# Patient Record
Sex: FEMALE | Race: WHITE
[De-identification: names, ages, dates, MRNs, and addresses within clinical notes are randomized per-mention and may not be internally consistent; named-entity substitution may affect disease eponyms.]

---

## 2017-07-11 ENCOUNTER — HOSPITAL ENCOUNTER (EMERGENCY)
Dept: HOSPITAL 25 - ED | Age: 52
Discharge: HOME | End: 2017-07-11
Payer: COMMERCIAL

## 2017-07-11 ENCOUNTER — HOSPITAL ENCOUNTER (EMERGENCY)
Dept: HOSPITAL 25 - UCEAST | Age: 52
Discharge: ANOTHER HOSPITAL | End: 2017-07-11
Payer: COMMERCIAL

## 2017-07-11 VITALS — SYSTOLIC BLOOD PRESSURE: 107 MMHG | DIASTOLIC BLOOD PRESSURE: 73 MMHG

## 2017-07-11 VITALS — DIASTOLIC BLOOD PRESSURE: 72 MMHG | SYSTOLIC BLOOD PRESSURE: 112 MMHG

## 2017-07-11 DIAGNOSIS — R11.0: ICD-10-CM

## 2017-07-11 DIAGNOSIS — R11.2: ICD-10-CM

## 2017-07-11 DIAGNOSIS — R42: Primary | ICD-10-CM

## 2017-07-11 DIAGNOSIS — R00.1: ICD-10-CM

## 2017-07-11 LAB
ALBUMIN SERPL BCG-MCNC: 4.4 G/DL (ref 3.2–5.2)
ALP SERPL-CCNC: 82 U/L (ref 34–104)
ALT SERPL W P-5'-P-CCNC: 17 U/L (ref 7–52)
ANION GAP SERPL CALC-SCNC: 7 MMOL/L (ref 2–11)
AST SERPL-CCNC: 23 U/L (ref 13–39)
BUN SERPL-MCNC: 11 MG/DL (ref 6–24)
BUN/CREAT SERPL: 12.1 (ref 8–20)
CALCIUM SERPL-MCNC: 9.3 MG/DL (ref 8.6–10.3)
CHLORIDE SERPL-SCNC: 102 MMOL/L (ref 101–111)
GLOBULIN SER CALC-MCNC: 3.4 G/DL (ref 2–4)
GLUCOSE SERPL-MCNC: 86 MG/DL (ref 70–100)
HCO3 SERPL-SCNC: 29 MMOL/L (ref 22–32)
HCT VFR BLD AUTO: 43 % (ref 35–47)
HGB BLD-MCNC: 14.2 G/DL (ref 12–16)
MAGNESIUM SERPL-MCNC: 2 MG/DL (ref 1.9–2.7)
MCH RBC QN AUTO: 30 PG (ref 27–31)
MCHC RBC AUTO-ENTMCNC: 33 G/DL (ref 31–36)
MCV RBC AUTO: 90 FL (ref 80–97)
POTASSIUM SERPL-SCNC: 4 MMOL/L (ref 3.5–5)
PROT SERPL-MCNC: 7.8 G/DL (ref 6.4–8.9)
RBC # BLD AUTO: 4.79 10^6/UL (ref 4–5.4)
SODIUM SERPL-SCNC: 138 MMOL/L (ref 133–145)
TROPONIN I SERPL-MCNC: 0.01 NG/ML (ref ?–0.04)
TSH SERPL-ACNC: 6.5 MCIU/ML (ref 0.34–5.6)
WBC # BLD AUTO: 10.7 10^3/UL (ref 3.5–10.8)

## 2017-07-11 PROCEDURE — 83735 ASSAY OF MAGNESIUM: CPT

## 2017-07-11 PROCEDURE — 36415 COLL VENOUS BLD VENIPUNCTURE: CPT

## 2017-07-11 PROCEDURE — G0463 HOSPITAL OUTPT CLINIC VISIT: HCPCS

## 2017-07-11 PROCEDURE — 99213 OFFICE O/P EST LOW 20 MIN: CPT

## 2017-07-11 PROCEDURE — 80053 COMPREHEN METABOLIC PANEL: CPT

## 2017-07-11 PROCEDURE — 84443 ASSAY THYROID STIM HORMONE: CPT

## 2017-07-11 PROCEDURE — 84484 ASSAY OF TROPONIN QUANT: CPT

## 2017-07-11 PROCEDURE — 70450 CT HEAD/BRAIN W/O DYE: CPT

## 2017-07-11 PROCEDURE — 85025 COMPLETE CBC W/AUTO DIFF WBC: CPT

## 2017-07-11 PROCEDURE — 81003 URINALYSIS AUTO W/O SCOPE: CPT

## 2017-07-11 PROCEDURE — 99283 EMERGENCY DEPT VISIT LOW MDM: CPT

## 2017-07-11 PROCEDURE — 93005 ELECTROCARDIOGRAM TRACING: CPT

## 2017-07-11 PROCEDURE — 87086 URINE CULTURE/COLONY COUNT: CPT

## 2017-07-11 PROCEDURE — 83605 ASSAY OF LACTIC ACID: CPT

## 2017-07-11 PROCEDURE — 81015 MICROSCOPIC EXAM OF URINE: CPT

## 2017-07-11 NOTE — ED
Nevin CUEVAS Seung-Jae, scribed for Dewayne Mantilla MD on 07/11/17 at 1425 .





Dizziness





- HPI Summary


HPI Summary: 


51 y/o F pt referred from [facility] presents to the ED with c/o dizziness. 

Dizziness began Saturday morning and has been intermittent since onset. She 

characterizes the dizziness as a room spinning experience. Pt states the 

dizziness is worsened with movement and changing head position and can happen 

in both a supine position and in a walking motion. Associated Sx include nausea

, vomiting, "turning of objects" in vision. Pt also denies fever, congestion, 

and coughing.





- History Of Current Complaint


Chief Complaint: EDDizziness


Stated Complaint: DIZINESS/SENT FROM [facility]


Time Seen by Provider: 07/11/17 14:09


Hx Obtained From: Patient


Onset/Duration: Still Present


Timing: Intermittent Episode Lasting


Severity Initially: Moderate


Severity Currently: Severe


Character: Room Spinning


Aggravating Factor(s): Change In Head Position, Other - general movement and 

walking


Alleviating Factor(s): Nothing


Associated Signs And Symptoms: Positive: Nausea, Vomiting, Visual Changes.  

Negative: Fever





- Allergies/Home Medications


Allergies/Adverse Reactions: 


 Allergies











Allergy/AdvReac Type Severity Reaction Status Date / Time


 


Erythromycin AdvReac Severe GI Upset Verified 07/11/17 11:52


 


envirmental Allergy Mild runny nose Uncoded 07/11/17 11:52





   and eyes  











Home Medications: 


 Home Medications





Ibuprofen TAB* [Motrin TAB* 800 MG] 600 - 800 tab PO Q6H PRN 07/11/17 [History 

Confirmed 07/11/17]











PMH/Surg Hx/FS Hx/Imm Hx


Previously Healthy: No


Endocrine/Hematology History: Reports: Hx Thyroid Disease - hashimotos


   Denies: Hx Anticoagulant Therapy, Hx Diabetes


Cardiovascular History: Reports: Hx Hypertension


   Denies: Hx Congestive Heart Failure, Hx Deep Vein Thrombosis, Hx Myocardial 

Infarction, Hx Pacemaker/ICD


Respiratory History: 


   Denies: Hx Asthma, Hx Chronic Obstructive Pulmonary Disease (COPD), Hx Lung 

Cancer, Hx Pneumonia, Hx Pulmonary Embolism


GI History: Reports: Hx Ulcer - gerd


   Denies: Hx Gall Bladder Disease, Hx Gastrointestinal Bleed, Hx Urosepsis


 History: Reports: Hx Kidney Stones - None causing a problem at this time., 

Other  Problems/Disorders - hx stones per pt


   Denies: Hx Renal Disease


Musculoskeletal History: Reports: Other Musculoskeletal History - Sciatica


Neurological History: Reports: Hx Migraine, Other Neuro Impairments/Disorders - 

vertigo hx occassional


   Denies: Hx Dementia, Hx Seizures, Hx Transient Ischemic Attacks (TIA)


Psychiatric History: 


   Denies: Hx Anxiety, Hx Depression, Hx Schizophrenia, Hx Bipolar Disorder





- Cancer History


Hx Chemotherapy: No


Hx Radiation Therapy: No





- Surgical History


Surgery Procedure, Year, and Place: tubal,right foot , left shoulder,septal 

plasty, ENDOMETRIAL ABLATION.  left ear


Infectious Disease History: No


Infectious Disease History: 


   Denies: Hx Clostridium Difficile, Hx Hepatitis, Hx Human Immunodeficiency 

Virus (HIV), Hx of Known/Suspected MRSA, Hx Shingles, Hx Tuberculosis, Traveled 

Outside the [place] in Last 30 Days





- Family History


Known Family History: Positive: Cardiac Disease - mother CHF, brother CHF, 

Hypertension


   Negative: Diabetes





- Social History


Alcohol Use: Rare


Hx Substance Use: No


Substance Use Type: Reports: None


Smoking Status (MU): Never Smoked Tobacco





Review of Systems


Negative: Fever


Positive: Other - Negative congestion


Negative: Cough


Positive: Vomiting, Nausea


Neurological: Other - Dizziness (room spinning)


All Other Systems Reviewed And Are Negative: Yes





Physical Exam


Triage Information Reviewed: Yes


Vital Signs On Initial Exam: 


 Initial Vitals











Temp Pulse Resp BP Pulse Ox


 


 97.4 F   50   16   111/83   97 


 


 07/11/17 14:08  07/11/17 14:08  07/11/17 14:08  07/11/17 14:08  07/11/17 14:08











Vital Signs Reviewed: Yes


Appearance: Positive: Well-Appearing, No Pain Distress


Skin: Positive: Warm, Skin Color Reflects Adequate Perfusion, Dry


Head/Face: Positive: Normal Head/Face Inspection


Eyes: Positive: Normal, Other: - No nystagmus


ENT: Positive: Normal ENT inspection


Neck: Positive: Supple, Nontender


Respiratory/Lung Sounds: Positive: Clear to Auscultation, Breath Sounds Present


Cardiovascular: Positive: RRR


Abdomen Description: Positive: Nontender, Soft


Bowel Sounds: Positive: Present


Musculoskeletal: Positive: Normal


Neurological: Positive: Normal


Psychiatric: Positive: Normal





Diagnostics





- Vital Signs


 Vital Signs











  Temp Pulse Resp BP Pulse Ox


 


 07/11/17 14:13  98.1 F  52  16  111/83  100


 


 07/11/17 14:08  97.4 F  50  16  111/83  97














- Laboratory


Lab Results: 


 Lab Results











  07/11/17 07/11/17 07/11/17 Range/Units





  13:10 13:10 13:10 


 


WBC  10.7    (3.5-10.8)  10^3/ul


 


RBC  4.79    (4.0-5.4)  10^6/ul


 


Hgb  14.2    (12.0-16.0)  g/dl


 


Hct  43    (35-47)  %


 


MCV  90    (80-97)  fL


 


MCH  30    (27-31)  pg


 


MCHC  33    (31-36)  g/dl


 


RDW  15    (10.5-15)  %


 


Plt Count  255    (150-450)  10^3/ul


 


MPV  10    (7.4-10.4)  um3


 


Neut % (Auto)  79.7    (38-83)  %


 


Lymph % (Auto)  15.0 L    (25-47)  %


 


Mono % (Auto)  4.0    (1-9)  %


 


Eos % (Auto)  1.0    (0-6)  %


 


Baso % (Auto)  0.3    (0-2)  %


 


Absolute Neuts (auto)  8.5 H    (1.5-7.7)  10^3/ul


 


Absolute Lymphs (auto)  1.6    (1.0-4.8)  10^3/ul


 


Absolute Monos (auto)  0.4    (0-0.8)  10^3/ul


 


Absolute Eos (auto)  0.1    (0-0.6)  10^3/ul


 


Absolute Basos (auto)  0    (0-0.2)  10^3/ul


 


Absolute Nucleated RBC  0.01    10^3/ul


 


Nucleated RBC %  0.1    


 


Sodium   138   (133-145)  mmol/L


 


Potassium   4.0   (3.5-5.0)  mmol/L


 


Chloride   102   (101-111)  mmol/L


 


Carbon Dioxide   29   (22-32)  mmol/L


 


Anion Gap   7   (2-11)  mmol/L


 


BUN   11   (6-24)  mg/dL


 


Creatinine   0.91   (0.51-0.95)  mg/dL


 


Est GFR ( Amer)   84.2   (>60)  


 


Est GFR (Non-Af Amer)   65.4   (>60)  


 


BUN/Creatinine Ratio   12.1   (8-20)  


 


Glucose   86   ()  mg/dL


 


Lactic Acid    1.3  (0.5-2.0)  mmol/L


 


Calcium   9.3   (8.6-10.3)  mg/dL


 


Magnesium   2.0   (1.9-2.7)  mg/dL


 


Total Bilirubin   0.40   (0.2-1.0)  mg/dL


 


AST   23   (13-39)  U/L


 


ALT   17   (7-52)  U/L


 


Alkaline Phosphatase   82   ()  U/L


 


Troponin I   0.01   (<0.04)  ng/mL


 


Total Protein   7.8   (6.4-8.9)  g/dL


 


Albumin   4.4   (3.2-5.2)  g/dL


 


Globulin   3.4   (2-4)  g/dL


 


Albumin/Globulin Ratio   1.3   (1-3)  


 


TSH   6.50 H   (0.34-5.60)  mcIU/mL


 


Urine Color     


 


Urine Appearance     


 


Urine pH     (5-9)  


 


Ur Specific Gravity     (1.010-1.030)  


 


Urine Protein     (Negative)  


 


Urine Ketones     (Negative)  


 


Urine Blood     (Negative)  


 


Urine Nitrate     (Negative)  


 


Urine Bilirubin     (Negative)  


 


Urine Urobilinogen     (Negative)  


 


Ur Leukocyte Esterase     (Negative)  


 


Urine Glucose     (Negative)  


 


Urine Ascorbic Acid     (Negative)  














  07/11/17 Range/Units





  16:47 


 


WBC   (3.5-10.8)  10^3/ul


 


RBC   (4.0-5.4)  10^6/ul


 


Hgb   (12.0-16.0)  g/dl


 


Hct   (35-47)  %


 


MCV   (80-97)  fL


 


MCH   (27-31)  pg


 


MCHC   (31-36)  g/dl


 


RDW   (10.5-15)  %


 


Plt Count   (150-450)  10^3/ul


 


MPV   (7.4-10.4)  um3


 


Neut % (Auto)   (38-83)  %


 


Lymph % (Auto)   (25-47)  %


 


Mono % (Auto)   (1-9)  %


 


Eos % (Auto)   (0-6)  %


 


Baso % (Auto)   (0-2)  %


 


Absolute Neuts (auto)   (1.5-7.7)  10^3/ul


 


Absolute Lymphs (auto)   (1.0-4.8)  10^3/ul


 


Absolute Monos (auto)   (0-0.8)  10^3/ul


 


Absolute Eos (auto)   (0-0.6)  10^3/ul


 


Absolute Basos (auto)   (0-0.2)  10^3/ul


 


Absolute Nucleated RBC   10^3/ul


 


Nucleated RBC %   


 


Sodium   (133-145)  mmol/L


 


Potassium   (3.5-5.0)  mmol/L


 


Chloride   (101-111)  mmol/L


 


Carbon Dioxide   (22-32)  mmol/L


 


Anion Gap   (2-11)  mmol/L


 


BUN   (6-24)  mg/dL


 


Creatinine   (0.51-0.95)  mg/dL


 


Est GFR ( Amer)   (>60)  


 


Est GFR (Non-Af Amer)   (>60)  


 


BUN/Creatinine Ratio   (8-20)  


 


Glucose   ()  mg/dL


 


Lactic Acid   (0.5-2.0)  mmol/L


 


Calcium   (8.6-10.3)  mg/dL


 


Magnesium   (1.9-2.7)  mg/dL


 


Total Bilirubin   (0.2-1.0)  mg/dL


 


AST   (13-39)  U/L


 


ALT   (7-52)  U/L


 


Alkaline Phosphatase   ()  U/L


 


Troponin I   (<0.04)  ng/mL


 


Total Protein   (6.4-8.9)  g/dL


 


Albumin   (3.2-5.2)  g/dL


 


Globulin   (2-4)  g/dL


 


Albumin/Globulin Ratio   (1-3)  


 


TSH   (0.34-5.60)  mcIU/mL


 


Urine Color  Yellow  


 


Urine Appearance  Cloudy  


 


Urine pH  7.0  (5-9)  


 


Ur Specific Gravity  1.009 L  (1.010-1.030)  


 


Urine Protein  Negative  (Negative)  


 


Urine Ketones  Negative  (Negative)  


 


Urine Blood  Negative  (Negative)  


 


Urine Nitrate  Negative  (Negative)  


 


Urine Bilirubin  Negative  (Negative)  


 


Urine Urobilinogen  Negative  (Negative)  


 


Ur Leukocyte Esterase  1+ H  (Negative)  


 


Urine Glucose  Negative  (Negative)  


 


Urine Ascorbic Acid  * H  (Negative)  











Result Diagrams: 


 07/11/17 13:10





 07/11/17 13:10


Lab Statement: Any lab studies that have been ordered have been reviewed, and 

results considered in the medical decision making process.





- CT


  ** Brain CT


CT Interpretation: No Acute Changes - Impression: Negative examination


CT Interpretation Completed By: Radiologist





- EKG


  ** 14:47


Cardiac Rate: Bradycardia - 45 bpm


EKG Rhythm: Sinus Bradycardia


ST Segment: Non-Specific - T wave flattening





Re-Evaluation





- Re-Evaluation


  ** 16:35


Re-Evaluation Time: 16:35


Change: Improved





  ** Second Eval


Re-Evaluation Time: 17:32


Change: Improved


Comment: Discussed plan to D/C





Dizzy Course/Dx





- Course


Course Of Treatment: Ms. Aquino has had mild vertiginous symptoms for a couple 

days that got worse today while vacuuming.  I could not elicit any nystagmus 

with either Rosston-Halpike maneuver.  Her W/U was WNL and she imporved quite a bit 

with meclizine.  I think this is a peripheral vertigo, likely labrynthitis and 

will treat her symtomatically and recommend F/U if not improving.





- Diagnoses


Provider Diagnoses: 


 Vertigo








- Provider Notifications


Discussed Care Of Patient With: Jose Loyd


Time Discussed With Above Provider: 16:40


Instructed by Provider To: Other - Dr. Loyd states that it sounds like mild 

peripheral vertigo.





Discharge





- Discharge Plan


Condition: Stable


Disposition: HOME


Prescriptions: 


Meclizine TAB* [Antivert 12.5 TAB*] 25 mg PO TID PRN #20 tab


 PRN Reason: Dizziness


Patient Education Materials:  Vertigo (ED)


Forms:  *Work Release


Referrals: 


Douglas Copeland MD [Primary Care Provider] - 3 Days





The documentation as recorded by the Nevin lujan Seung-Jae accurately reflects 

the service I personally performed and the decisions made by me, Dewayne Mantilla MD.

## 2017-07-11 NOTE — RAD
INDICATION: Vertigo     



COMPARISON: March 21, 2014



TECHNIQUE: Noncontrast axial source images were acquired from the skull base to the

vertex.



FINDINGS:



Ventricles/sulci: The ventricles and cisterns are normal in size and configuration for

age.



Brain parenchyma: There is no focal parenchymal finding, evidence of intracranial mass, 

or intracranial mass effect.



Intracranial hemorrhage:None.



Extra-axial spaces: There are no abnormal extra axial fluid collections or evidence of

extra-axial mass.



Calvarium: There is no calvarial fracture or other calvarial abnormality.



Scalp: There is no evidence of scalp or extracalvarial soft tissue abnormality.



Paranasal sinuses/mastoid: The paranasal sinuses and mastoid air cells are clear.



Other: None.



IMPRESSION: NEGATIVE EXAMINATION

## 2017-07-11 NOTE — UC
Dizzy HPI


HPI Summary: 





complaint of feeling dizzy since 7/8/17


intermittent spells of dizziness


starts every morning and then fades away


today while vaccuming and she started having dizziness , nausea and  

diaphoresis today


feels chest pressure since she woke up this morning


feels anxious


denies shortness of breath


dizziness increases with movement of her head


feels extremely tired today


denies V/D


denies fever and chills





- History Of Current Complaint


Chief Complaint: UCDizziness


Stated Complaint: DIZZINESS


Time Seen by Provider: 07/11/17 12:25


Hx Obtained From: Patient


Hx Last Menstrual Period: ten years ago





- Risk Factors


Cardiac Risk Factors: Hypertension, CHF, Family History, CAD





- Allergies/Home Medications


Allergies/Adverse Reactions: 


 Allergies











Allergy/AdvReac Type Severity Reaction Status Date / Time


 


Erythromycin AdvReac Severe GI Upset Verified 07/11/17 11:52


 


envirmental Allergy Mild runny nose Uncoded 07/11/17 11:52





   and eyes  














PMH/Surg Hx/FS Hx/Imm Hx


Previously Healthy: Yes - hashimoto's, vertigo 10 years ago


Endocrine History: Hypothyroidism


Cardiovascular History: Hypertension


GI/ History: Gastroesophageal Reflux


Other History Of: 


   Negative For: HIV, Hepatitis B, Hepatitis C, Anticoagulant Therapy





- Surgical History


Surgical History: Yes


Surgery Procedure, Year, and Place: tubal,right foot , left shoulder,septal 

plasty, ENDOMETRIAL ABLATION.  left ear





- Family History


Known Family History: Positive: Cardiac Disease - mother CHF, brother CHF, 

Hypertension


   Negative: Diabetes





- Social History


Occupation: Employed Full-time


Lives: With Family


Alcohol Use: Rare


Substance Use Type: None


Smoking Status (MU): Never Smoked Tobacco





- Immunization History


Most Recent Influenza Vaccination: none


Most Recent Tetanus Shot: unsure





Review of Systems


Constitutional: Negative


Skin: Negative


Eyes: Negative


ENT: Negative


Respiratory: Negative


Cardiovascular: Other - chest pressure


Gastrointestinal: Nausea


Genitourinary: Negative


Motor: Negative


Neurovascular: Negative


Musculoskeletal: Negative


Neurological: Negative


Psychological: Anxious


All Other Systems Reviewed And Are Negative: Yes





Physical Exam


Triage Information Reviewed: Yes


Appearance: No Pain Distress, Well-Nourished, Obese


Vital Signs: 


 Initial Vital Signs











Temp  98.4 F   07/11/17 11:44


 


Pulse  51   07/11/17 11:44


 


Resp  18   07/11/17 11:44


 


BP  107/73   07/11/17 11:44











Vital Signs Reviewed: Yes


Eyes: Positive: Conjunctiva Clear


ENT: Positive: Pharynx normal, TMs normal


Neck: Positive: No Lymphadenopathy


Respiratory: Positive: Lungs clear, Normal breath sounds, No respiratory 

distress, No accessory muscle use


Cardiovascular: Positive: No Murmur, Pulses Normal, Bradycardia


Abdomen Description: Positive: Nontender, No Organomegaly, Soft.  Negative: CVA 

Tenderness (R), CVA Tenderness (L), Distended, Guarding


Bowel Sounds: Positive: Present


Musculoskeletal: Positive: No Edema


Neurological: Positive: Alert


Psychological: Positive: Normal Response To Family, Age Appropriate Behavior


Skin Exam: Normal





Diagnostics





- EKG


Cardiac Rate: Bradycardia


Ectopy: None





Dizzy Course/Dx





- Course


Course Of Treatment: exam completed.  ECG shows sinus bradycardia- 49 BPM no 

ectopy.  pt complains of dizziness, chest pressure, nausea, anxiety since she 

woke up this morning.  due to acute nature of complaints will send to higher 

level of care for further evaluation





- Differential Dx/Diagnosis


Differential Diagnosis/HQI/PQRI: Anxiety, Myocardial Infarction, Other - vertigo

, sinus bradycardia d/t electrolyte disturbances


Provider Diagnoses: dizziness





- Physician Notifications


Discussed Patient Care With: Sima Puente


Time Discussed With Above Provider: 13:05





Discharge





- Discharge Plan


Condition: Stable


Disposition: TRANS HIGHER LVL OF CARE FAC

## 2018-12-23 ENCOUNTER — HOSPITAL ENCOUNTER (EMERGENCY)
Dept: HOSPITAL 25 - UCEAST | Age: 53
Discharge: HOME | End: 2018-12-23
Payer: COMMERCIAL

## 2018-12-23 VITALS — DIASTOLIC BLOOD PRESSURE: 70 MMHG | SYSTOLIC BLOOD PRESSURE: 104 MMHG

## 2018-12-23 DIAGNOSIS — I10: ICD-10-CM

## 2018-12-23 DIAGNOSIS — R51: Primary | ICD-10-CM

## 2018-12-23 DIAGNOSIS — R42: ICD-10-CM

## 2018-12-23 DIAGNOSIS — Z88.1: ICD-10-CM

## 2018-12-23 PROCEDURE — 93005 ELECTROCARDIOGRAM TRACING: CPT

## 2018-12-23 PROCEDURE — 99211 OFF/OP EST MAY X REQ PHY/QHP: CPT

## 2018-12-23 PROCEDURE — 96372 THER/PROPH/DIAG INJ SC/IM: CPT

## 2018-12-23 PROCEDURE — G0463 HOSPITAL OUTPT CLINIC VISIT: HCPCS

## 2018-12-23 NOTE — UC
Headache HPI





- HPI Summary


HPI Summary: 





The patient is a 53-year-old female with a greater then 10 year history of 

intermittent vertigo who presents here with the onset of vertigo about 2 weeks 

ago.  These were her typical allergic vertiginous episodes and they responded 

to meclizine.  She now no longer has true vertigo but has some mild dizziness.  

About the time of the onset of the vertigo she had a mild headache.  She has a 

history of migraines.  She states that the headache is left hemicranial and has 

persisted for the past 2 weeks.  She has some mild visual disturbances.  What 

is unusual is that she has some mild left sided neck pain associated with this.

  Currently her headache level is about a 4.  She is on propranolol for blood 

pressure.





- History Of Current Complaint


Chief Complaint: UCHeadache


Stated Complaint: HEADACHE


Time Seen by Provider: 12/23/18 07:39


Hx Obtained From: Patient


Hx Last Menstrual Period: ablasion/tubal ligation 2005


Onset/Duration: Gradual Onset, Lasting Weeks


Onset Of Symptoms: Gradual


Initially Headache Was: Mild


Currently Pain Is: Mild


Pain Intensity: 4


Pain Scale Used: 0-10 Numeric


Timing: Constant


Character: Dull


Location of Headache: Other: - left hemicranial


Allevating Factor(s): Nothing


Associated Signs And Symptoms: Positive: Dizziness, Neck Pain, Visual Changes.  

Negative: Nausea, Vomiting, Sinus Pressure, Fever, Neck Stiffness, Decreased LOC





- Allergies/Home Medications


Allergies/Adverse Reactions: 


 Allergies











Allergy/AdvReac Type Severity Reaction Status Date / Time


 


MS Erythromycin AdvReac Severe GI Upset Verified 07/11/17 11:52





[Erythromycin]     


 


envirmental Allergy Mild runny nose Uncoded 07/11/17 11:52





   and eyes  











Home Medications: 


 Home Medications





Omeprazole  12/23/18 [History]


traZODone TAB* [Desyrel TAB*]  12/23/18 [History]











PMH/Surg Hx/FS Hx/Imm Hx


Previously Healthy: Yes


Cardiovascular History: Hypertension


Neurological History: Migraine


Psychological History: Anxiety


Other History Of: 


   Negative For: HIV, Hepatitis B, Hepatitis C, Anticoagulant Therapy





- Surgical History


Surgical History: Yes


Surgery Procedure, Year, and Place: tubal,right foot , left shoulder,septal 

plasty, ENDOMETRIAL ABLATION.  left ear





- Family History


Known Family History: Positive: Cardiac Disease - mother CHF, brother CHF, 

Hypertension


   Negative: Diabetes





- Social History


Alcohol Use: None


Substance Use Type: None


Smoking Status (MU): Never Smoked Tobacco





- Immunization History


Most Recent Influenza Vaccination: none


Most Recent Tetanus Shot: unsure





Review of Systems


All Other Systems Reviewed And Are Negative: Yes


Constitutional: Positive: Negative


Skin: Positive: Negative


Eyes: Positive: Negative


ENT: Positive: Negative


Respiratory: Positive: Negative


Cardiovascular: Positive: Negative


Gastrointestinal: Positive: Negative


Genitourinary: Positive: Negative


Motor: Positive: Negative


Neurovascular: Positive: Negative


Musculoskeletal: Positive: Negative


Neurological: Positive: Headache


Psychological: Positive: Negative





Physical Exam


Triage Information Reviewed: Yes


Appearance: Well-Appearing, No Pain Distress, Well-Nourished


Vital Signs: 


 Initial Vital Signs











Temp  97.4 F   12/23/18 07:35


 


Pulse  54   12/23/18 07:35


 


Resp  16   12/23/18 07:35


 


BP  104/70   12/23/18 07:35


 


Pulse Ox  97   12/23/18 07:35











Eyes: Positive: Conjunctiva Clear, Other: - eomi/perrl


ENT: Positive: Pharynx normal, Uvula midline.  Negative: Nasal congestion, 

Nasal drainage, TMs normal - left TM scarred, TM bulging, Tonsillar swelling, 

Tonsillar exudate, Trismus, Muffled voice, Hoarse voice, Dental tenderness, 

Sinus tenderness


Neck: Positive: Supple, Nontender, No Lymphadenopathy


Respiratory: Positive: Lungs clear, Normal breath sounds, No respiratory 

distress, No accessory muscle use


Cardiovascular: Positive: RRR, Bradycardia


Musculoskeletal: Positive: ROM Intact, No Edema


Neurological: Positive: Alert


Psychological Exam: Normal


Skin Exam: Normal





Diagnostics





- EKG


Cardiac Rate: Bradycardia


Cardiac Rhythm: Sinus: Normal


Ectopy: None


ST Segment: Normal


EKG Comparison: No Significant Change





Re-Evaluation





- Re-Evaluation


  ** First Eval


Re-Evaluation Time: 09:10


Change: Improved





Headache Course/Dx





- Differential Dx/Diagnosis


Provider Diagnosis: 


 Cervicogenic headache, Dizziness, nonspecific








Discharge





- Sign-Out/Discharge


Documenting (check all that apply): Patient Departure


All imaging exams completed and their final reports reviewed: No Studies





- Discharge Plan


Condition: Stable


Disposition: HOME


Patient Education Materials:  Acute Headache (ED)


Referrals: 


Douglas Copeland MD [Primary Care Provider] - As Soon As Possible


Additional Instructions: 


If symptoms worsen I suggest you go to the ER





See your MD first available appt





you can resume motrin 6 hours after the shot





- Billing Disposition and Condition


Condition: STABLE


Disposition: Home

## 2019-01-07 ENCOUNTER — HOSPITAL ENCOUNTER (EMERGENCY)
Dept: HOSPITAL 25 - ED | Age: 54
Discharge: HOME | End: 2019-01-07
Payer: COMMERCIAL

## 2019-01-07 VITALS — DIASTOLIC BLOOD PRESSURE: 71 MMHG | SYSTOLIC BLOOD PRESSURE: 119 MMHG

## 2019-01-07 DIAGNOSIS — Z88.1: ICD-10-CM

## 2019-01-07 DIAGNOSIS — I10: ICD-10-CM

## 2019-01-07 DIAGNOSIS — R11.0: ICD-10-CM

## 2019-01-07 DIAGNOSIS — K21.9: ICD-10-CM

## 2019-01-07 DIAGNOSIS — R51: Primary | ICD-10-CM

## 2019-01-07 DIAGNOSIS — Z87.891: ICD-10-CM

## 2019-01-07 PROCEDURE — 96374 THER/PROPH/DIAG INJ IV PUSH: CPT

## 2019-01-07 PROCEDURE — 99283 EMERGENCY DEPT VISIT LOW MDM: CPT

## 2019-01-07 PROCEDURE — 70450 CT HEAD/BRAIN W/O DYE: CPT

## 2019-01-07 PROCEDURE — 96375 TX/PRO/DX INJ NEW DRUG ADDON: CPT

## 2019-01-07 NOTE — XMS REPORT
Continuity of Care Document (CCD)

 Created on:2018



Patient:Shannan Aquino

Sex:Female

:1965

External Reference #:[id number]





Demographics







 Address  [address] [place] [address]

 

 Home Phone  [phone]

 

 Mobile Phone  [phone]

 

 Work Phone  [phone]

 

 Email Address  [email or web address]

 

 Preferred Language  en

 

 Marital Status  Not  or 

 

 Pentecostal Affiliation  Unknown

 

 Race  White

 

 Ethnic Group  Not  or 









Author







 Name  Yasmani Lopez D.O.

 

 Address  [address] [place] [id number]









Support







 Name  Relationship  Address  Phone

 

 Tito Newman  Domestic/Life Partner  Unavailable  [phone]









Care Team Providers







 Name  Role  Phone

 

 HCP given  Primary Care Physician  Unavailable









Payers







 Type  Date  Identification Numbers  Payment Provider  Subscriber

 

   Effective:  Policy Number: [id number]  [employer]  Shannan Aquino



   2017    Plan  









 PayID: [id number]  [address] [address]









 [place] [address]









   Effective: 2014  Policy Number:  [employer] ([place] [facility]  Shannan Aquino



     [id number]    









 Expires: 2017  PayID: [id number]  [address] [address]









 [place] [address]







Advance Directives







 Description

 

 No Information Available







Problems







 Date  Description  Provider  Status

 

 Onset: 2012  Benign essential hypertension  Douglas Copeland M.D.  Active

 

 Onset: 2012  Migraine without aura, not refractory  Douglas Copeland M.D.  Active

 

 Onset: 2012  Gastroesophageal reflux disease  Douglas Copeland M.D.  
Active

 

 Onset: 2012  Myalgia & Myositis Unspecified  Douglas Copeland M.D.  
Active

 

 Onset: 2012  Rosacea  Douglas Copeland M.D.  Active

 

 Onset: 2012  Arthralgia of the lower leg  Douglas Copeland M.D.  Active

 

 Onset: 2014  Hashimoto thyroiditis  Douglas Copeland M.D.  Active

 

 Onset: 2014  Malaise and fatigue  Yasmani Lopez D.O.  Active

 

 Onset: 2014  Arthralgia of the pelvic region and  Yasmani Lopez D.O.  
Active



   thigh    

 

 Onset: 2017  Sciatica  Yasmani Lopez D.O.  Active

 

 Onset: 2017  Essential hypertension  Douglas Copeland M.D.  Active







Family History







 Date  Family Member(s)  Problem(s)  Comments

 

   Father  Alcoholism  

 

   Father   due to Cancer  () - Malignant



       Melanoma, age 55

 

   Father  Cancer, Melanoma  

 

   Mother  High Blood Pressure  

 

 :  (age 70  Mother   due to CHF  



 Years)      

 

   Children  2 sons  

 

   First Brother  High Blood Pressure  

 

   First Brother  General Health Good  

 

   Second Brother  General Health Good  

 

 :  (age 45  Third Brother   due to CHF  



 Years)      

 

   Third Brother  High Blood Pressure  







Social History







 Type  Date  Description  Comments

 

 Birth Sex    Unknown  

 

 Education    Highest Level  



     Completed College  

 

 Marital Status      

 

 Lives With    Boyfriend  

 

 Lives With    Son  

 

 Smoke-Free    Home is smoke-free  

 

 Work Status  2018  Currently Working  cleaning; at [place]



       [employer]

 

 Tobacco Use  Start: Unknown  Former Cigarette  quit in  ("only



   End: Unknown  Smoker  smoked for a couple



       of years")

 

 Tobacco Use  Start: Unknown  Non Smoker  

 

 Smoking Status  Reviewed: 18  Non Smoker  

 

 Exercise Type/Frequency  2018  Exercises  walks a lot for work,



       easily >10K steps/d.



       Also walks/runs on



       her own

 

 Sun Exposure    minimum amount of sun  



     exposure  

 

 Sun Exposure    Uses sunscreen  

 

 Seat Belt/Car Seat    always uses seat belt  

 

 Currently Active    Patient is currently  



     sexually active  

 

 Contraceptive Methods    None  

 

 Age [address] [place]    19 Years Old  

 

 # Partners in a Lifetime    8  







Allergies, Adverse Reactions, Alerts







 Description

 

 No Known Drug Allergies







Medications







 Medication  Date  Status  Form  Strength  Qnty  SIG  Indications  Ordering



                 Provider

 

 Propranolol    Active  Tablets  20mg  60tabs  take 1  I10  Silcoff,



 HCL  018          tablet by    Douglas,



             mouth twice    M.D.



             a day for    



             high blood    



             pressure and    



             Headache    



             Prevention    









 G43.009









 Trazodone HCL  2018  Active  Tablets  50mg  60tabs  Take One To  G47.00  
Silcoff,



             Two Tablets    Douglas,



             By Mouth AT    M.D.



             Bedtime For    



             Trouble    



             Sleeping    

 

 Selenium  2018  Active  Capsules  200mcg    1 q am  E06.3  Unknown

 

 Omeprazole  2017  Active  Capsules DR  40mg  30caps  take 1  K21.9  
Silcoff,



             capsule by    Douglas,



             mouth once    M.D.



             daily for    



             Acid Reflux    









 R07.0

 

 K21.0









 Meclizine HCL  2017  Active  Tablets  25mg    take 1 tablet    Unknown



             by mouth three    



             times a day if    



             needed for    



             dizziness    

 

 Multivitamin  2017  Active  Tablets      1 by mouth    Unknown



 Adult            every day    

 

 Ra Vitamin D-3  2016  Active  Capsules  5000Uni  90c  1 tab by mouth  
E55  Sopchak,



         t  aps  every day  or 7  .9  Yasmani,



             tabs once a    D.O.



             week    

 

 Wrist Splint  2015  Active  Misc    1un  fit to patient  719  Sopchak,



 Elastic/Small/Me          its  to assist in  .43  Yasmani,



 dium            wrist    D.O.



             extension. use    



             as directed for    



             wrist pain    

 

 Metronidazole  2012  Active  Cream  0.75%  45u  apply twice a  695  
Silcoff,



           nit  day to affected  .3  marshall Cole  areas on face    M.D.



             (for acne    



             rosacea)    

 

 Vitamin B    Active  Tablets      1 by mouth    Unknown



 Complex            twice daily    

 

                 

 

 Acetaminophen-Co  2017 -  Hx  Tablets  300-30m  20t  take one tid  B02  
Silcoff,



 deine #3  2018      g  abs  for pain, prn  .9  GABE Cole

 

 Metaxalone  2017 -  Hx  Tablets  800mg  30t  1 tid  B02  Silcoff,



   2018        abs    .9  GABE Cole

 

 Valtrex  2017 -  Hx  Tablets  1gm  21t  1 tab tid  B02  Silcoff,



   2017        abs    .9  GABE Cole

 

 Ibuprofen  2017 -  Hx  Tablets  600mg  60t  1 by mouth  B02  Silcoff,



   10/31/2017        abs  every 6 hours  .9  Douglas,



             as needed for    M.D.



             pain    

 

 Gabapentin  2017 -  Hx  Capsules  100mg  90c  1 tabs by mouth  R42  
Sopchak,



   2017        aps  three times a    Yasmani,



             day    D.O.

 

 Fluticasone  2017 -  Hx  Suspension  50mcg/A  48u  Inhale 2 sprays  H65  
Sopchak,



 Propionate  2018      ct  nit  into nostril  .22  Yasmani,



           s  daily in each    D.O.



             nostril for    



             nose    



             inflammation    

 

 Amoxicillin  2016 -  Hx  Tablets  500mg  30t  1 by mouth  H65  Silcoff,



   2016        abs  three times a  .02  kapil Cole for 10    M.D.



             days, for ear    



             infection    

 

 PT For Low Back,  2016 -  Hx        evaluate and  M54  Silcokatharina,



 Right Hip And  2017          treat, instruct  .5  Douglas



 Right Leg Pain            in GABE bolden



             modalities prn    



             (combination of    



             lbp, troch    



             bursitis,    



             possible hip    



             joint    



             pathology)    









 M25.551

 

 M70.61









 Ra Vitamin D-3  2015 -  Hx  Capsules  5000Unit  90caps  1 tab by    
Jessica,



   2015          mouth every    Yasmani, D.O.



             day  or 7    



             tabs once a    



             week    

 

 Vitamin D3  2015 -  Hx  Capsules  1000Unit  OTC  1 by mouth  268.  
Dinorah,



   2016          every day  9  GABE Cole

 

 Propranolol HCL  2015 -  Hx  Tablets  60mg  60tabs  take 12  I10  
Silcoff,



   2018          tablet by    el Cole twice a    M.D.



             day for blood    



             pressure and    



             Migraine    



             Headaches    









 G43.009









 Ciprodex  09/10/2014 -  Hx  Suspension  0.3-0.1%  10cc  4 drops in  388.9  
Luis E,



   2015          both ears    Sheridan MIRANDA



             twice a day    



             for 7 days    



             stop and    



             call if you    



             are no    



             better in 48    



             hours    

 

 Off Work  09/10/2014 -  Hx        until monday  388.9  Luis E,



   2015          the 15th due    Sheridan MIRANDA



             to acute    



             illness    

 

 Vitamin D-3  2014 -  Hx  Tablets  5000Unit  90tabs  1 tab by  268.9  
Sopchak,



   2015          mouth every    Yasmani,



             day  or 7    D.O.



             tabs once a    



             week    

 

 Ankle Lace-Up  2013 -  Hx  Misc    1units  for left  719.47  Dinorah



 Brace  2014          ankle    GABE Cole

 

 PT For Left  2013 -  Hx        please  719.47  Dinorah,



 Ankle And Foot  2014          evaluae and    Douglas,



 Pain And Ankle            treat,    M.D.



 Instability            instruct i    



             hep,    



             modalities    



             prn    

 

 Famotidine  2013 -  Hx  Tablets  40mg  90tabs  take 1  K21.9  Silcoff,



   2017          tablet by    Douglas,



             mouth Every    M.D.



             Night For    



             Acid Reflux    









 R07.0

 

 K21.0









 PT For  2012 -  Hx        please evaluate  719.46  Dinorah,



 Bilateral Knee  2014          and treatDouglas M.D.



 Pain            instruct in    



             hep, modalities    



             prn    

 

 Famotidine  2012 -  Hx  Tablets  20mg  30tab  Take 1 Tablet  530.81  
Silcoff,



   2013        s  Every Evening    GABE Cole



             For Acid    



             Reflux.    









 784.1

 

 536.8









 Metronidazole  2012 -  Hx  Gel  0.75%  45gm  apply to  695.3  Silcoff,



   2012          affected    Douglas



             areas on    M.D.



             face twice    



             a day (for    



             acne    



             rosacea)    

 

 Nortriptyline HCL  2012 -  Hx  Capsules  10mg  100caps  1 pill  729.1  
Silcoff,



   2012          nightly (30    Douglas,



             minutes    M.D.



             before bed)    



             to start;    



             inc by 1    



             tablet    



             weekly if    



             needed; max    



             5    



             pills/night    









 780.52









 Flonase  2012 -  Hx  Suspension  50mcg/Act    2 sprays  477.9  Unknown



   2012          each nostril    



             once daily    



             as needed    



             for    



             allergies    

 

 Propranolol HCL  2012 -  Hx  Tablets  80mg  60ta  take 1  401.1  Silcoff,



   2015        bs  tablet twice    Douglas,



             a day for    M.D.



             blood    



             pressure and    



             migraines    









 346.10









 Protonix  2011 -  Hx  Tablets  40mg  30tabs  1 by mouth every  530.81  
Silcoff,



   2014          day for acid    Douglas,



             reflux/swallowing    M.D.



             difficulties    

 

 Aldomet  2004 -  Hx  Tablets  250mg  30tabs  1 po qhs to start.  401.1  
Silcoff,



   2012          We may need to    Douglas,



             increase it to    M.D.



             twice a day if your    



             blood pressure    



             remains elevated    

 

 Norvasc  2004 -  Hx  Tablets  5mg  90tabs  1 PO qd For High  401.1  
Silcoff,



   2004          Blood Pressure    GABE Cole

 

 Metoprolol  2004 -  Hx  Tablets  50mg  90tabs  1/2 PO bid  401.1  Silcoff
,



   2004              GABE Cole

 

 Vitamin B   -  Hx  Tablets      1 by mouth once    Unknown



 Complex  2016          daily    







Immunizations







 CPT Code  Status  Date  Vaccine  Lot #

 

 [id number]  Given  10/05/2018  Influenza Virus Vaccine, Quadrivalent, Split,  [id number]



       Preservative Free  

 

 73063  Given  2012  Adacel or Boostrix, TDaP  [id number]

 

 03721  Given  2003  Flu, Split Virus, 2-35 Mo Dose  









 









 15447  Refused  2012  Flu, Split Virus 3Yrs  

 

 70753  Refused  2012  Flu, Split Virus 3Yrs  







Vital Signs







 Date  Vital  Result  Comment

 

 2018 11:46am  BP Systolic  118 mmHg  









 BP Diastolic  80 mmHg  

 

 Weight  147.00 lb  









 10/16/2018  8:51am  BP Systolic  112 mmHg  









 BP Diastolic  78 mmHg  









 10/05/2018 10:30am  BP Systolic  110 mmHg  









 BP Diastolic  70 mmHg  

 

 Weight  149.00 lb  









 2018  4:07pm  BP Systolic  104 mmHg  









 BP Diastolic  64 mmHg  

 

 Heart Rate  52 /min  reg

 

 Respiratory Rate  12 /min  not laboured

 

 Height  62.50 inches  5'2.50"

 

 Weight  152.00 lb  

 

 BMI (Body Mass Index)  27.4 kg/m2  









 2018 11:11am  BP Systolic  116 mmHg  









 BP Diastolic  68 mmHg  

 

 Height  63 inches  5'3"

 

 Weight  156.00 lb  154 at home

 

 BMI (Body Mass Index)  27.6 kg/m2  









 2018 11:05am  BP Systolic  104 mmHg  









 BP Diastolic  70 mmHg  









 2018 10:46am  BP Systolic  118 mmHg  









 BP Diastolic  80 mmHg  

 

 Height  63 inches  5'3"

 

 Weight  158.00 lb  

 

 BMI (Body Mass Index)  28.0 kg/m2  









 2017  3:33pm  BP Systolic  132 mmHg  









 BP Diastolic  80 mmHg  

 

 Heart Rate  70 /min  









 2017  3:40pm  BP Systolic  110 mmHg  









 BP Diastolic  70 mmHg  

 

 Body Temperature  98.0 F  









 2017  3:47pm  BP Systolic  120 mmHg  









 BP Diastolic  80 mmHg  

 

 Body Temperature  98.3 F  

 

 Weight  174.00 lb  w/shoes









 2017  2:42pm  BP Systolic  104 mmHg  









 BP Diastolic  68 mmHg  

 

 Weight  176.00 lb  









 2017  3:34pm  BP Systolic  102 mmHg  









 BP Diastolic  68 mmHg  

 

 BP Systolic Recheck  118 mmHg  R arm sitting

 

 BP Diastolic Recheck  80 mmHg  R arm sitting

 

 Heart Rate  56 /min  reg

 

 Weight  174.00 lb  w/shoes









 2017  4:01pm  BP Systolic  110 mmHg  









 BP Diastolic  70 mmHg  

 

 Height  62.75 inches  5'2.75"

 

 Weight  173.00 lb  

 

 BMI (Body Mass Index)  30.9 kg/m2  









 2017 12:57pm  BP Systolic  118 mmHg  









 BP Diastolic  76 mmHg  

 

 Weight  173.00 lb  









 2016  4:38pm  BP Systolic  120 mmHg  









 BP Diastolic  80 mmHg  

 

 Height  62.75 inches  5'2.75"

 

 Weight  173.00 lb  

 

 BMI (Body Mass Index)  30.9 kg/m2  









 06/15/2016  1:46pm  BP Systolic  116 mmHg  









 BP Diastolic  80 mmHg  









 2016  9:09am  BP Systolic  110 mmHg  









 BP Diastolic  80 mmHg  

 

 Weight  170.00 lb  









 2016 11:40am  BP Systolic  110 mmHg  









 BP Diastolic  78 mmHg  

 

 Body Temperature  98.8 F  

 

 Weight  173.00 lb  









 2016 10:59am  BP Systolic  128 mmHg  









 BP Diastolic  88 mmHg  

 

 Body Temperature  98.8 F  

 

 Height  63 inches  5'3"

 

 Weight  172.00 lb  

 

 BMI (Body Mass Index)  30.5 kg/m2  









 2015  3:33pm  BP Systolic  111 mmHg  









 BP Diastolic  73 mmHg  

 

 Heart Rate  56 /min  

 

 Weight  174.00 lb  w/shoes









 2015  2:32pm  BP Systolic  116 mmHg  









 BP Diastolic  70 mmHg  

 

 Heart Rate  56 /min  reg

 

 Respiratory Rate  12 /min  not laboured

 

 Height  63 inches  5'3"

 

 Weight  170.00 lb  

 

 BMI (Body Mass Index)  30.1 kg/m2  









 09/10/2014 11:03am  BP Systolic  110 mmHg  









 BP Diastolic  68 mmHg  

 

 Body Temperature  98.7 F  

 

 Weight  166.00 lb  









 2014  9:24am  BP Systolic  140 mmHg  









 BP Diastolic  88 mmHg  

 

 Height  63.5 inches  5'3.50" shoes on

 

 Weight  166.00 lb  shoes on

 

 BMI (Body Mass Index)  28.9 kg/m2  









 2014  3:13pm  BP Systolic  130 mmHg  









 BP Diastolic  90 mmHg  

 

 Weight  166.00 lb  shoes on









 2014  4:40pm  BP Systolic  118 mmHg  









 BP Diastolic  70 mmHg  









 2014  9:18am  BP Systolic  120 mmHg  









 BP Diastolic  80 mmHg  

 

 Body Temperature  98.3 F  

 

 Weight  168.00 lb  









 2014  2:17pm  BP Systolic  102 mmHg  









 BP Diastolic  74 mmHg  

 

 Heart Rate  60 /min  reg

 

 Respiratory Rate  12 /min  not laboured

 

 Height  62.50 inches  5'2.50"

 

 Weight  164.00 lb  

 

 BMI (Body Mass Index)  29.5 kg/m2  









 2013  4:18pm  BP Systolic  110 mmHg  









 BP Diastolic  80 mmHg  

 

 Weight  171.00 lb  









 2013  1:21pm  BP Systolic  135 mmHg  ????









 BP Diastolic  122 mmHg  ????

 

 BP Systolic Recheck  128 mmHg  

 

 BP Diastolic Recheck  72 mmHg  

 

 Heart Rate  59 /min  60

 

 Body Temperature  98.4 F  

 

 Weight  171.00 lb  









 2012  4:15pm  BP Systolic  118 mmHg  









 BP Diastolic  72 mmHg  

 

 Weight  170.00 lb  









 2012  3:52pm  BP Systolic  114 mmHg  









 BP Diastolic  70 mmHg  

 

 Body Temperature  98.2 F  

 

 Weight  170.00 lb  









 08/10/2012  4:46pm  BP Systolic  112 mmHg  









 BP Diastolic  76 mmHg  

 

 Heart Rate  56 /min  reg

 

 Respiratory Rate  12 /min  not laboured

 

 Height  63 inches  5'3"

 

 Weight  167.00 lb  

 

 BMI (Body Mass Index)  29.6 kg/m2  









 2012  4:05pm  BP Systolic  108 mmHg  









 BP Diastolic  70 mmHg  

 

 Height  63 inches  5'3"

 

 Weight  168.00 lb  

 

 BMI (Body Mass Index)  29.8 kg/m2  









 2012  2:57pm  BP Systolic  110 mmHg  









 BP Diastolic  80 mmHg  

 

 Weight  167.00 lb  

 

 Last Menstrual Period  0  









 2012  3:44pm  BP Systolic  116 mmHg  









 BP Diastolic  78 mmHg  

 

 Weight  169.00 lb  









 2012  4:01pm  BP Systolic  124 mmHg  









 BP Diastolic  80 mmHg  

 

 Heart Rate  72 /min  reg

 

 Respiratory Rate  12 /min  not laboured

 

 Height  62.75 inches  5'2.75"

 

 Weight  167.00 lb  

 

 BMI (Body Mass Index)  29.8 kg/m2  

 

 Last Menstrual Period  0  2004  3:21pm  BP Systolic  138 mmHg  









 BP Diastolic  82 mmHg  

 

 BP Systolic Recheck  138 mmHg  R arm sitting

 

 BP Diastolic Recheck  78 mmHg  R arm sitting

 

 Weight  150.00 lb  

 

 Last Menstrual Period  0  









 2004  1:13pm  BP Systolic  152 mmHg  









 BP Diastolic  84 mmHg  

 

 BP Systolic Recheck  142 mmHg  R arm sitting

 

 BP Diastolic Recheck  94 mmHg  R arm sitting

 

 Weight  145.00 lb  

 

 Last Menstrual Period  6079628  









 2004  2:43pm  BP Systolic  162 mmHg  R arm sitting









 BP Diastolic  100 mmHg  R arm sitting

 

 Respiratory Rate  12 /min  not laboured

 

 Body Temperature  99.7 F  

 

 Weight  140.00 lb  







Results







 Test  Date  Facility  Test  Result  H/L  Range  Note

 

 Laboratory test  10/16/2018  [facility]  Surgical  SEE RESULT      1



 finding    [phone]  Pathology  BELOW      

 

 Laboratory test  10/05/2018  [facility]  Cytology  SEE RESULT      2



 finding    [phone]    BELOW      

 

 Laboratory test  2018  [facility]  TSH (Thyroid  5.50  N  0.34-5.60  



 finding    [phone]  Stim Horm)  mcIU/mL      









 T3 Free  3.00 pg/mL  N  2.5-3.9  

 

 Free T4 (Free Thyroxine)  0.82 ng/dL  N  0.61-1.12  

 

 Thyroxine  8.68 g/mL  N  6.09-12.23  









 Laboratory test  2018  [facility]  TSH (Thyroid  5.62 mcIU/mL  High  
0.34-5.60  



 finding    [phone]  Stim Horm)        

 

 CBC Auto Diff  2018  [facility]  White Blood  6.7 10^3/uL  N  3.5-
10.8  



     [phone]  Count        









 Red Blood Count  4.75 10^6/uL  N  4.0-5.4  

 

 Hemoglobin  13.5 g/dL  N  12.0-16.0  

 

 Hematocrit  41 %  N  35-47  

 

 Mean Corpuscular Volume  85 fL  N  80-97  

 

 Mean Corpuscular Hemoglobin  28 pg  N  27-31  

 

 Mean Corpuscular HGB Conc  33 g/dL  N  31-36  

 

 Red Cell Distribution Width  14 %  N  10.5-15  

 

 Platelet Count  196 10^3/uL  N  150-450  

 

 Mean Platelet Volume  10 um3  N  7.4-10.4  

 

 Abs Neutrophils  4.8 10^3/uL  N  1.5-7.7  

 

 Abs Lymphocytes  1.3 10^3/uL  N  1.0-4.8  

 

 Abs Monocytes  0.4 10^3/uL  N  0-0.8  

 

 Abs Eosinophils  0.2 10^3/uL  N  0-0.6  

 

 Abs Basophils  0 10^3/uL  N  0-0.2  

 

 Abs Nucleated RBC  0 10^3/uL      

 

 Granulocyte %  71.6 %  N  38-83  

 

 Lymphocyte %  19.4 %  Low  25-47  

 

 Monocyte %  5.9 %  N  0-7  

 

 Eosinophil %  2.7 %  N  0-6  

 

 Basophil %  0.4 %  N  0-2  

 

 Nucleated Red Blood Cells %  0.3      









 Urine Micro Inhouse  2017  In House  Ua WBC  7      3









 Ua RBC  -      

 

 Ua Casts  -      

 

 Ua Epi  TNTC      

 

 Ua Other  calcium crystals      

 

 Ua Glucose  -      

 

 Ua Bilirubin  3+      

 

 Ua Ketones  -      

 

 Ua Specific Gravity  1.030      

 

 Ua Blood  -      

 

 Ua PH  5.0      

 

 Ua Protein  -      

 

 Ua Urobilinogen  -      

 

 Ua Nitrite  -      

 

 Ua Leukocytes  tr      









 CBC Auto Diff  2017  [facility]  White Blood Count  10.7 10^3/uL  N  
3.5-10.8  



     [phone]          









 Red Blood Count  4.79 10^6/uL  N  4.0-5.4  

 

 Hemoglobin  14.2 g/dL  N  12.0-16.0  

 

 Hematocrit  43 %  N  35-47  

 

 Mean Corpuscular Volume  90 fL  N  80-97  

 

 Mean Corpuscular Hemoglobin  30 pg  N  27-31  

 

 Mean Corpuscular HGB Conc  33 g/dL  N  31-36  

 

 Red Cell Distribution Width  15 %  N  10.5-15  

 

 Platelet Count  255 10^3/uL  N  150-450  

 

 Mean Platelet Volume  10 um3  N  7.4-10.4  

 

 Abs Neutrophils  8.5 10^3/uL  High  1.5-7.7  

 

 Abs Lymphocytes  1.6 10^3/uL  N  1.0-4.8  

 

 Abs Monocytes  0.4 10^3/uL  N  0-0.8  

 

 Abs Eosinophils  0.1 10^3/uL  N  0-0.6  

 

 Abs Basophils  0 10^3/uL  N  0-0.2  

 

 Abs Nucleated RBC  0.01 10^3/uL  N    

 

 Granulocyte %  79.7 %  N  38-83  

 

 Lymphocyte %  15.0 %  Low  25-47  

 

 Monocyte %  4.0 %  N  1-9  

 

 Eosinophil %  1.0 %  N  0-6  

 

 Basophil %  0.3 %  N  0-2  

 

 Nucleated Red Blood Cells %  0.1  N    









 Comp Metabolic Panel  2017  [facility]  Sodium  138 mmol/L  N  133-
145  



     [phone]          









 Potassium  4.0 mmol/L  N  3.5-5.0  

 

 Chloride  102 mmol/L  N  101-111  

 

 Co2 Carbon Dioxide  29 mmol/L  N  22-32  

 

 Anion Gap  7 mmol/L  N  2-11  

 

 Glucose  86 mg/dL  N    

 

 Blood Urea Nitrogen  11 mg/dL  N  6-24  

 

 Creatinine  0.91 mg/dL  N  0.51-0.95  

 

 BUN/Creatinine Ratio  12.1  N  8-20  

 

 Calcium  9.3 mg/dL  N  8.6-10.3  

 

 Total Protein  7.8 g/dL  N  6.4-8.9  

 

 Albumin  4.4 g/dL  N  3.2-5.2  

 

 Globulin  3.4 g/dL  N  2-4  

 

 Albumin/Globulin Ratio  1.3  N  1-3  

 

 Total Bilirubin  0.40 mg/dL  N  0.2-1.0  

 

 Alkaline Phosphatase  82 U/L  N    

 

 Alt  17 U/L  N  7-52  

 

 Ast  23 U/L  N  13-39  

 

 Egfr Non-  65.4  N  >60  

 

 Egfr   84.2  N  >60  4









 Laboratory test finding  2017  [facility]  Magnesium  2.0 mg/dL  N  
1.9-2.7  



     [phone]          









 Troponin-I (TnI)  0.01 ng/mL  N  <0.04  

 

 TSH (Thyroid Stim Horm)  6.50 mcIU/mL  High  0.34-5.60  

 

 Lactic Acid  1.3 mmol/L  N  0.5-2.0  5









 Urinalysis Profile  2017  [facility]  Urine Color  Yellow  N    



     [phone]          









 Urine Appearance  Cloudy  N    

 

 Urine Specific Gravity  1.009  Low  1.010-1.030  

 

 Urine pH  7.0  N  5-9  

 

 Urine Urobilinogen  Negative  N  Negative  

 

 Urine Ketones  Negative  N  Negative  

 

 Urine Protein  Negative  N  Negative  

 

 Urine Leukocytes  1+  Abnormal  Negative  

 

 Urine Blood  Negative  N  Negative  

 

 *  *  Abnormal  Negative  6

 

 Urine Nitrite  Negative  N  Negative  

 

 Urine Bilirubin  Negative  N  Negative  

 

 Urine Glucose  Negative  N  Negative  









 Laboratory test  2017  [facility]  Urine Culture And  SEE RESULT    
  7



 finding    [phone]  Sensitivities  BELOW      

 

 Laboratory test  2017  [facility]  Point of Care  119 mg/dL  High  74
-10  8



 finding    [phone]  Glucose      6  

 

 Laboratory test  2016  [facility]  TSH (Thyroid Stim  4.36 mcIU/mL  
N  0.34-  



 finding    [phone]  Horm)      5.60  









 T3 Free  2.60 pg/mL  N  2.5-3.9  

 

 Free T4 (Free Thyroxine)  0.80 ng/dL  N  0.61-1.12  









 CBC Auto Diff  2016  [facility]  White Blood Count  8.3 10^3/uL  N  
3.5-10.8  



     [phone]          









 Red Blood Count  5.16 10^6/uL  N  4.0-5.4  

 

 Hemoglobin  14.9 g/dL  N  12.0-16.0  

 

 Hematocrit  45 %  N  35-47  

 

 Mean Corpuscular Volume  87 fL  N  80-97  

 

 Mean Corpuscular Hemoglobin  29 pg  N  27-31  

 

 Mean Corpuscular HGB Conc  33 g/dL  N  31-36  

 

 Red Cell Distribution Width  14 %  N  10.5-15  

 

 Platelet Count  269 10^3/uL  N  150-450  

 

 Mean Platelet Volume  10 um3  N  7.4-10.4  

 

 Abs Neutrophils  6.4 10^3/uL  N  1.5-7.7  

 

 Abs Lymphocytes  1.3 10^3/uL  N  1.0-4.8  

 

 Abs Monocytes  0.5 10^3/uL  N  0-0.8  

 

 Abs Eosinophils  0.1 10^3/uL  N  0-0.6  

 

 Abs Basophils  0 10^3/uL  N  0-0.2  

 

 Abs Nucleated RBC  0 10^3/uL  N    

 

 Granulocyte %  77.3 %  N  38-83  

 

 Lymphocyte %  15.7 %  Low  25-47  

 

 Monocyte %  5.5 %  N  1-9  

 

 Eosinophil %  1.1 %  N  0-6  

 

 Basophil %  0.4 %  N  0-2  

 

 Nucleated Red Blood Cells %  0  N    









 Comp Metabolic Panel  2016  [facility]  Sodium  137 mmol/L  N  133-
145  



     [phone]          









 Potassium  4.4 mmol/L  N  3.5-5.0  

 

 Chloride  102 mmol/L  N  101-111  

 

 Co2 Carbon Dioxide  30 mmol/L  N  22-32  

 

 Anion Gap  5 mmol/L  N  2-11  

 

 Glucose  77 mg/dL  N    

 

 Blood Urea Nitrogen  11 mg/dL  N  6-24  

 

 Creatinine  0.90 mg/dL  N  0.51-0.95  

 

 BUN/Creatinine Ratio  12.2  N  8-20  

 

 Calcium  9.4 mg/dL  N  8.6-10.3  

 

 Total Protein  7.3 g/dL  N  6.4-8.9  

 

 Albumin  4.2 g/dL  N  3.2-5.2  

 

 Globulin  3.1 g/dL  N  2-4  

 

 Albumin/Globulin Ratio  1.4  N  1-3  

 

 Total Bilirubin  0.50 mg/dL  N  0.2-1.0  

 

 Alkaline Phosphatase  82 U/L  N    

 

 Alt  12 U/L  N  7-52  

 

 Ast  14 U/L  N  13-39  

 

 Egfr Non-  66.5  N  >60  

 

 Egfr   85.6  N  >60  9









 Celiac Panel  2016  [facility]  Tissue Transglutaminase IgA  <1.2 U/
mL  N    10



     [phone]  Ab        









 Immunoglobulin A  287 mg/dL  N  61 - 356  

 

 Celiac Interpretation  See Comment  N    11









 Celiac Hla DQ1/DQ2  2016  [facility]  Hla-Dqa1  SEE BELOW  N    12



     [phone]          









 Hla-DQB1  SEE BELOW  N    13

 

 Celiac Gene Pairs Present?  Yes  N    

 

 Celiac Gene Interpretation  See Comment  N    14









 Laboratory test  2016  [facility]  C Reactive  12.67 mg/L  High  < 
5.00  15



 finding    [phone]  Protein        









 Erythrocyte Sed Rate  24 mm/Hr  High  0-14  

 

 Vitamin D Total 25(Oh)  46.1 ng/mL  N  30-50  

 

 Vitamin B12  254 pg/mL  N  180-914  16









 Connective Tissue Panel  2016  [facility]  Anti-Nuclear Antibody  
0.3 U  N    17



     [phone]          









 Cyclic Citrullinated Peptide  18.9 U  N    18

 

 Interpretation  See Comment  N    19









 Laboratory  09/15/2016  [facility]  TSH (Thyroid  5.51  N  0.34-5.60  



 test finding    [phone]  Stim Horm)  mcIU/mL      

 

 Laboratory  06/15/2016  [facility]  TSH (Thyroid  6.59  High  0.34-5.60  



 test finding    [phone]  Stim Horm)  ?IU/mL      

 

 Xray  2015  [facility]  X-Ray, Wrist,  normal      



       Complete, Min.  wrist      



       Of 3 Views R        

 

 Laboratory  2015  [facility]  TSH (Thyroid  3.70 IU/mL  N  0.34-5.60
  



 test finding    [phone]  Stimulating        



       Horm)        









 Vitamin D Total 25(Oh)  22.8 ng/mL  Low  30-50  









 Urine Micro Inhouse  2015  In House  Ua WBC  0-2      









 Ua RBC  0-1      

 

 Ua Casts  -      

 

 Ua Epi  0-2      

 

 Ua Other  sediment      

 

 Ua Glucose  -      

 

 Ua Bilirubin  -      

 

 Ua Ketones  -      

 

 Ua Specific Gravity  1.005      

 

 Ua Blood  -      

 

 Ua PH  7.5      

 

 Ua Protein  -      

 

 Ua Urobilinogen  -      

 

 Ua Nitrite  -      

 

 Ua Leukocytes  2+      









 Urine Culture And  2015  [facility]  Urine Culture  (SEE NOTE)      
20



 Sensitivities    [phone]          

 

 Lyme Western Blot  2014  [facility]  Lyme Disease  Negative    
Negative  



     [phone]  IgG Ab WB        









 Lyme Disease IgG Bands Present  No bands detecte <SEE NOTE> kDa      21

 

 Lyme Disease IgM Ab WB  Negative    Negative  

 

 Lyme Disease IgM Bands Present  No bands detecte <SEE NOTE> kDa      22

 

 Lyme Disease Interpretation  See Comment      23









 Laboratory test  2014  [facility]  Erythrocyte Sed  21 mm/Hr  High  0
-14  



 finding    [phone]  Rate        









 Creatine Kinase  38 U/L      

 

 Angiotension Converting Enzyme  26 U/L    8 - 53  24

 

 Hepatitis C Antibody  Nonreactive    Nonreactive  

 

 Collins (Anti-Nuclear AB) Screen  Negative    Negative  

 

 Cyclic Citrullinated Pept IgG  <15.6 U      25

 

 Rheumatoid Factor  <15 IU/mL    <15  26

 

 Uric Acid  5.3 mg/dL    2.3-6.6  









 Vitamin D, 25  2014  [facility]  25-Hydroxy Vitamin D2  <4.0 ng/mL  
    



 Hydroxy    [phone]          









 25-Hydroxy Vitamin D3  26 ng/mL      

 

 25-Hydroxy Vitamin D Total  26 ng/mL      27









 Ehrlichia Igg &  2014  [facility]  Anaplasma  <1:64 titer    <1:64  
28



 Igm Abs    [phone]  phagocytophila IgG        









 Ehrlichia chaffeensis IgG AB  <1:64 titer    <1:64  29









 Laboratory test  2014  [facility]  Babesiosis  <1:64 titer    <1:64  
30



 finding    [phone]  Evaluation        

 

 Xray  2014  [facility]  CT, Brain (Or  l      



     [address]  Head) Without        



     [place] [address]  Contrast        



     [phone]          









 CT, Sinus without Contrast  past sinus surgy      









 Laboratory test  2014  [facility]  TSH (Thyroid  3.53 miu/mL    0.34-
5.60  



 finding    [phone]  Stimulating Horm)        

 

 CBC Auto Diff  2014  [facility]  White Blood Count  9.4 10^3/uL    
4.8-10.8  



     [phone]          









 Red Blood Count  4.74 10^6/uL    4.0-5.4  

 

 Hemoglobin  13.8 g/dL    12.0-16.0  

 

 Hematocrit  41 %    35-47  

 

 Mean Corpuscular Volume  86 fL    80-97  

 

 Mean Corpuscular Hemoglobin  29 pg    27-31  

 

 Mean Corpuscular HGB Conc  34 g/dL    31-36  

 

 Red Cell Distribution Width  14 %    10.5-15  

 

 Platelet Count  279 10^3/uL    150-450  

 

 Mean Platelet Volume  10 um3    7.4-10.4  

 

 Abs Neutrophils  6.4 10^3/uL    1.5-7.7  

 

 Abs Lymphocytes  2.2 10^3/uL    1.0-4.8  

 

 Abs Monocytes  0.6 10^3/uL    0-0.8  

 

 Abs Eosinophils  0.2 10^3/uL    0-0.6  

 

 Abs Basophils  0 10^3/uL    0-0.2  

 

 Abs Nucleated RBC  0.01 10^3/uL      

 

 Granulocyte %  68.4 %    38-83  

 

 Lymphocyte %  23.1 %  Low  25-47  

 

 Monocyte %  6.3 %    1-9  

 

 Eosinophil %  1.8 %    0-6  

 

 Basophil %  0.4 %    0-2  

 

 Nucleated Red Blood Cells %  0.1      









 Comp Metabolic Panel  2014  [facility]  Sodium  137 mmol/L    133-
145  



     [phone]          









 Potassium  4.7 mmol/L    3.5-5.0  

 

 Chloride  101 mmol/L    101-111  

 

 Co2 Carbon Dioxide  33.0 mmol/L  High  22-32  

 

 Anion Gap  3.0 mmol/L    2-11  

 

 Glucose  88 mg/dL      

 

 Blood Urea Nitrogen  8 mg/dL    6-24  

 

 Creatinine  0.70 mg/dL    0.50-1.40  

 

 BUN/Creatinine Ratio  11.4    8-20  

 

 Calcium  9.1 mg/dL    8.1-9.9  

 

 Total Protein  6.6 g/dL    6.2-8.1  

 

 Albumin  3.9 g/dL    3.6-5.4  

 

 Globulin  2.7 g/dL    2-4  

 

 Albumin/Globulin Ratio  1.4    1-3  

 

 Total Bilirubin  0.5 mg/dL    0.4-1.5  

 

 Alkaline Phosphatase  70 U/L      

 

 Alt  21 U/L    14-54  

 

 Ast  23 U/L    12-42  

 

 Egfr Non-  90.1    >60  

 

 Egfr   115.9    >60  31









 Basic Metabolic Panel  2013  [facility]  Sodium  137 mmol/L    133-
145  



     [phone]          









 Potassium  4.3 mmol/L    3.5-5.0  

 

 Chloride  101 mmol/L    101-111  

 

 Co2 Carbon Dioxide  29.0 mmol/L    22-32  

 

 Anion Gap  7.0 mmol/L    2-11  

 

 Glucose  106 mg/dL  High    

 

 Blood Urea Nitrogen  11 mg/dL    6-24  

 

 Creatinine  0.80 mg/dL    0.50-1.40  

 

 BUN/Creatinine Ratio  13.8    8-20  

 

 Calcium  8.9 mg/dL    8.1-9.9  

 

 Egfr Non-  77.2    >60  

 

 Egfr   99.3    >60  32









 Ua Inhouse  2013  In House  Ua Glucose  -      









 Ua Bilirubin  -      

 

 Ua Ketones  -      

 

 Ua Specific Gravity  1.005      

 

 Ua Blood  -      

 

 Ua PH  6.5      

 

 Ua Protein  -      

 

 Ua Urobilinogen  -      

 

 Ua Nitrite  -      

 

 Ua Leukocytes  -      









 Surgical  2013  [facility]  S  RUN DATE:      33



 Pathology    [phone]    01/15/ <SEE      



         NOTE>      

 

 Clotest  2013  [facility]  Clotest  NEGATIVE      34



     [phone]          

 

 Syphilis  2012  [facility]  Syphilis IgG  Nonreactive    Nonreactive
  35



 Screen    [phone]          









 RPR  TNP    Nonreactive  

 

 RPR Titer  TNP      

 

 Pediatric/Maternal  NO      









 Collins (Antinuclear  2008  [facility]  Antinuclear AB  NEGATIVE    
Negative  36



 Antibodies)    [phone]          

 

 Laboratory test  2008  [facility]  Rheumatoid  < 20.0 IU/mL    Less 
Than  



 finding    [phone]  Factor      20  









 Thyroxine  7.4 g/dL    5-12  

 

 TSH  3.43 MIU/ML    0.34-5.60  

 

 Erythrocyte Sed Rate  12 MM/HR    0-15  

 

 Complement C1Q  25 mg/dL  Abnormal  -  37









 1  SEE RESULT BELOW



   -----------------------------------------------------------------------------
---------------



   Name:  DESTINYSHANNAN                  : 1965    Attend Dr: Karyn COELHO



   Acct:  [id number]  Unit: [id number]  AGE: 53            Location:  [facility]



   Reg:   10/16/18                        SEX: F             Status:    REG REF



   -----------------------------------------------------------------------------
---------------



   



   SPEC: [id number]            MIKAYLA: 10/16/      [facility] DR: Karyn COELHO



   REQ:  [id number]             RECD: 10/16/



   STATUS: SOUT



   _



   ORDERED:  LEVEL 4



   COMMENTS: [id number]



   



   FINAL DIAGNOSIS



   



   



   Uterus, endocervix, biopsy:



   -- Benign endocervical polyp.



   



   



   -----------------------------------------------------------------------------
---------------



   



   



   PRE-OPERATIVE DIAGNOSIS



   



   Benign neoplasm of vagina



   



   GROSS DESCRIPTION



   



   The specimen is received in formalin labeled, Cervical Polyp, and consists 
of a 1.1 x 1.0 by



   up to 0.6 cm tan-red rubbery polypoid soft tissue fragment admixed with 
scant mucus.  The



   specimen is inked, serially sectioned and entirely submitted in one cassette.



   



   Signed by and Reported on: __________              Melanie Rodriguez MD 
10/17/18 1108



   



   -----------------------------------------------------------------------------
---------------



   



   



   



   



   



   



   



   



   



   



   



   



   



   ** END OF REPORT **



   



   DEPARTMENT OF PATHOLOGY,  [address], [place], [place] [address]



   Phone # [phone]      Fax #[phone]



   Cruz Vasquez M.D. Director     [facility] # [id number]

 

 2  SEE RESULT BELOW



   -----------------------------------------------------------------------------
---------------



   Name:  SHANNAN AQUINO                  : 1965    Attend Dr: Karyn COELHO



   Acct:  [id number]  Unit: [id number]  AGE: 53            Location:  [facility]



   Reg:   10/05/18                        SEX: F             Status:    REG REF



   -----------------------------------------------------------------------------
---------------



   



   SPEC: [id number]            MIKAYLA: 10/05/      [facility] DR: Karyn COELHO



   REQ:  [id number]             RECD: 10/05/



   STATUS: SOUT



   _



   ORDERED:  TP IMAGE ANALYS, HPV/Thin Prep



   COMMENTS: [id number]



   Negative for Intraepithelial lesion or Malignancy



   



   -----------------------------------------------------------------------------
---------------



   Date     Time Test              Result   Flag (u) Normal Range



   10/05/18 1410 @ HPV RNA         Negative          Negative



   @



   @               The high-risk HPV types detected by the assay include: 16,



   @               18, 31, 33, 35, 39, 45, 51, 52, 56, 58, 59, 66, and 68.



   -----------------------------------------------------------------------------
---------------



   



   A. Ectocervical/Endocervical



   Specimen Adequacy:



   Satisfactory of evaluation



   Transformation zone component cannot be definitely identified due to



   presence of atrophy or other hormonal changes



   Patient Information:



   HPV: High risk HPV RNA testing regardless of pap results.



   Actual Specimen Date:         10/05/18



   LMP If Unknown:               12+ yrs ago



   Pregnant?:     N



   Post Menopausal?:             Y



   Hysterectomy?:                N



   



   Signed by and Reported on: __________              REMI Randall(ASCP) 10
/08/18 2216



   



   This Pap test was evaluated with the assistance of the AcceraPrep Test Imaging 
System. Due to



   cytologic findings at the imager microscope, comprehensive manual 
rescreening by a



   Cytotechnologist may be required.



   The Pap Smear is a screening test designed to aid in the detection of 
premalignant and



   malignant conditions of the uterine cervix.  It is not a diagnostic 
procedure and should



   not be used as the sole means of detecting cervical cancer.  Both false-
positive and false-



   negative reports do occur.  Depending on your risk status, a Pap smear 
should be obtained



   and evaluated every 1-3 years.



   



   -----------------------------------------------------------------------------
---------------



   



   



   ** END OF REPORT **



   



   DEPARTMENT OF PATHOLOGY,  [address] [place] [address]



   Phone # [phone]      Fax #[phone]



   Cruz Vasquez M.D. Director     [facility] # [id number]

 

 3  void, clear, yellow

 

 4  *******Because ethnic data is not always readily available,



   this report includes an eGFR for both -Americans and



   non- Americans.****



   The National Kidney Disease Education Program (NKDEP) does



   not endorse the use of the MDRD equation for patients that



   are not between the ages of 18 and 70, are pregnant, have



   extremes of body size, muscle mass, or nutritional status,



   or are non- or non-.



   According to the National Kidney Foundation, irrespective of



   diagnosis, the stage of the disease is based on the level of



   kidney function:



   Stage Description                      GFR(mL/min/1.73 m(2))



   1     Kidney damage with normal or decreased GFR       90



   2     Kidney damage with mild decrease in GFR          60-89



   3     Moderate decrease in GFR                         30-59



   4     Severe decrease in GFR                           15-29



   5     Kidney failure                       <15 (or dialysis)

 

 5  [place] Severe Sepsis and Septic Shock Management Bundle Measure



   requires all lactic acids initially measuring >2.0 mmol/L be



   repeated.

 

 6  *Ascorbic acid is present which may interfere with detection



   of blood.

 

 7  SEE RESULT BELOW



   -----------------------------------------------------------------------------
---------------



   Name:  SHANNAN AQUINO                  : 1965    Attend Dr: Dewayne Mantilla MD



   Acct:  [id number]  Unit: [id number]  AGE: 52            Location:  [facility]



   Re17                        SEX: F             Status:    DEP ER



   -----------------------------------------------------------------------------
---------------



   



   SPEC: [id number]         MIKAYLA:   17-    [facility] DR: Dewayne Mantilla MD



   REQ:  [id number]              RECD:   



   STATUS: MORENO VOGT DR: [facility]



   Douglas Copeland MD



   _



   SOURCE: URINE          SPDESC:



   ORDERED:  Urine Culture



   



   -----------------------------------------------------------------------------
---------------



   Procedure                         Result                         Reported   
        Site



   -----------------------------------------------------------------------------
---------------



   Urine Culture  Final                                             17-
1040      ML



   



   No growth of clinically significant organisms



   



   -----------------------------------------------------------------------------
---------------



   * ML - MAIN LAB (PSC1)



   .



   



   



   



   



   



   



   



   



   



   



   



   



   



   



   



   



   



   



   



   



   



   



   



   



   



   ** END OF REPORT **



   



   * ML=Testing performed at Main Lab



   DEPARTMENT OF PATHOLOGY,  [address] [place] [address]



   Phone # [phone]      Fax #[phone]



   Cruz Vasquez M.D. Director     [facility] # [id number]

 

 8  : [id number]

 

 9  *******Because ethnic data is not always readily available,



   this report includes an eGFR for both -Americans and



   non- Americans.****



   The National Kidney Disease Education Program (NKDEP) does



   not endorse the use of the MDRD equation for patients that



   are not between the ages of 18 and 70, are pregnant, have



   extremes of body size, muscle mass, or nutritional status,



   or are non- or non-.



   According to the National Kidney Foundation, irrespective of



   diagnosis, the stage of the disease is based on the level of



   kidney function:



   Stage Description                      GFR(mL/min/1.73 m(2))



   1     Kidney damage with normal or decreased GFR       90



   2     Kidney damage with mild decrease in GFR          60-89



   3     Moderate decrease in GFR                         30-59



   4     Severe decrease in GFR                           15-29



   5     Kidney failure                       <15 (or dialysis)

 

 10  -------------------REFERENCE VALUE--------------------------



   <4.0 (Negative)



   Test Performed by:



   [place] [address]



   : Tito Salmeron II, M.D., Ph.D.

 

 11  Negative serology. Celiac disease unlikely. However,



   approximately 10% of patients with celiac disease are



   seronegative. Also, patients who are already adhering to a



   gluten-free diet may be seronegative. If celiac disease is



   highly clinically suspected, consider HLA-DQ typing.



   Test Performed by:



   [place] [address]



   : Tito Salmeron II, M.D., Ph.D.

 

 12  RESULT: 



   -------------------REFERENCE VALUE--------------------------



   Not Applicable

 

 13  RESULT: 03:02,05:03



   DQ Serologic Equivalent:



   8,5



   -------------------REFERENCE VALUE--------------------------



   Not Applicable

 

 14  These genes are permissive for celiac disease.  The absence



   of HLA celiac permissive genes would make the presence of



   celiac disease unlikely.  However, these genes can also be



   present in the normal population.



   -------------------ADDITIONAL INFORMATION-------------------



   Method: Molecular typing of HLA antigens performed using



   reverse SSOP and/or SSP methods, reported as serological



   equivalents and low to medium resolution molecular values.



   Performing Laboratory CLIA# [id number]



   Test Performed by:



   [facility] - [address] [address]



   : Tito Salmeron II, M.D., Ph.D.

 

 15  Acute inflammation:  >10.00

 

 16  Normal Range 180 to 914



   Indeterminate Range 145 to 180



   Deficient Range  <145

 

 17  -------------------REFERENCE VALUE--------------------------



   <=1.0 (Negative)

 

 18  -------------------REFERENCE VALUE--------------------------



   <20.0 (Negative)

 

 19  Tests for antibodies to dsDNA and KING antigens are not



   performed automatically unless the COLLINS result is > or=



   3.0 U.  Studies performed at [facility] indicate that



   positive COLLINS results <3.0 U are rarely accompanied by



   positive second order tests.



   Test Performed by:



   [facility] - [place] [id number]



   : Tito Salmeron II, M.D., Ph.D.

 

 20  RUN DATE: 01/31/15               [facility] **LIVE**       
         PAGE    1



   RUN TIME: 0638             [address] [place] [address]   [id number]



   Specimen Inquiry



   



   -----------------------------------------------------------------------------
---------------



   Name:  SHANNAN AQUINO                  : 1965    Attend Dr: 
Ana Maria EPPS



   Acct:  [id number]  Unit: [id number]  AGE: 50            Location:  Critical access hospital:   01/29/15                        SEX: F             Status:    DEP ER



   -----------------------------------------------------------------------------
---------------



   



   SPEC: [id number]         MIKAYLA:   01/29/    [facility] DR: Ana Maria Wright DO



   REQ:  [id number]              RECD:   01/29/



   STATUS: MORENO VOGT DR: Douglas Copeland MD



   _



   SOURCE: URINE          SPDESC:



   ORDERED:  Urine Culture



   



   -----------------------------------------------------------------------------
---------------



   Procedure                         Result                         Verified   
        Site



   -----------------------------------------------------------------------------
---------------



   Urine Culture  Final                                             01/31/15-
1234      ML



   



   Organism 1                     NORMAL SHANTEL



   [place] Count                1-10,000 (Few) CFU/ML



   



   -----------------------------------------------------------------------------
---------------



   



   



   



   



   



   



   



   



   



   



   



   



   



   



   



   



   



   



   



   



   



   



   



   



   



   



   



   ** END OF REPORT **



   



   * ML=Testing performed at Main Lab



   DEPARTMENT OF PATHOLOGY,  [address] [place] [address]



   Phone # [phone]      Fax #[phone]



   Cruz Vasquez M.D. Director     [facility] # [id number]

 

 21  No bands detected

 

 22  No bands detected

 

 23  Specific serologic response to B. burgdorferi infection is



   not detected, but cannot rule out early infection during



   which low or undetectable antibody levels to B. burgdorferi



   may be present.  If clinically indicated, a new serum



   specimen should be submitted in 7-14 days.



   CDC criteria require >=5 bands for IgG or >=2 bands for IgM



   for the Immunoblot to be considered positive. Bands



   (e.g.,p41) may be detected in patients without Lyme



   disease, and patterns not meeting the CDC criteria should



   be interpreted with caution.



   Immunoblot should be ordered only on specimens that are



   positive or equivocal by a FDA-licensed Lyme disease



   antibody screening test (e.g., EIA).



   Test Performed by:



   [place] [address]



   : Levi Chatman III, M.D.

 

 24  Test Performed by:



   [place] [address]



   : Levi Chatman III, M.D.

 

 25  -- REFERENCE VALUE --



   <20.0 (Negative)



   Test Performed by:



   [place] [address]



   : Levi Chatman III, M.D.

 

 26  Test Performed by:



   [place] [address]



   : Levi Chatman III, M.D.

 

 27  -- REFERENCE VALUE --



   25-HYDROXY D TOTAL (D2+D3) Optimum levels in the healthy



   population are 20-50, patients with bone disease may



   benefit from higher levels within this range.



   Test Performed by:



   [place] [address]



   : Levi Chatman III, M.D.

 

 28  Analyte Specific Reagent: This test was developed and its



   performance characteristics determined by [facility]. It



   has not been cleared or approved by the U.S. Food and Drug



   Administration.

 

 29  Analyte Specific Reagent: This test was developed and its



   performance characteristics determined by [facility]. It



   has not been cleared or approved by the U.S. Food and Drug



   Administration.



   Test Performed by:



   [facility] [place] [address]



   : Levi Chatman III, M.D.

 

 30  Analyte Specific Reagent: This test was developed and its



   performance characteristics determined by [facility]. It



   has not been cleared or approved by the U.S. Food and Drug



   Administration.



   Test Performed by:



   [facility] [place] [address]



   : Levi Chatman III, M.D.

 

 31  *******Because ethnic data is not always readily available,



   this report includes an eGFR for both -Americans and



   non- Americans.****



   The National Kidney Disease Education Program (NKDEP) does



   not endorse the use of the MDRD equation for patients that



   are not between the ages of 18 and 70, are pregnant, have



   extremes of body size, muscle mass, or nutritional status,



   or are non- or non-.



   According to the National Kidney Foundation, irrespective of



   diagnosis, the stage of the disease is based on the level of



   kidney function:



   Stage Description                      GFR(mL/min/1.73 m(2))



   1     Kidney damage with normal or decreased GFR       90



   2     Kidney damage with mild decrease in GFR          60-89



   3     Moderate decrease in GFR                         30-59



   4     Severe decrease in GFR                           15-29



   5     Kidney failure                       <15 (or dialysis)

 

 32  *******Because ethnic data is not always readily available,



   this report includes an eGFR for both -Americans and



   non- Americans.****



   The National Kidney Disease Education Program (NKDEP) does



   not endorse the use of the MDRD equation for patients that



   are not between the ages of 18 and 70, are pregnant, have



   extremes of body size, muscle mass, or nutritional status,



   or are non- or non-.



   According to the National Kidney Foundation, irrespective of



   diagnosis, the stage of the disease is based on the level of



   kidney function:



   Stage Description                      GFR(mL/min/1.73 m(2))



   1     Kidney damage with normal or decreased GFR       90



   2     Kidney damage with mild decrease in GFR          60-89



   3     Moderate decrease in GFR                         30-59



   4     Severe decrease in GFR                           15-29



   5     Kidney failure                       <15 (or dialysis)

 

 33  RUN DATE: 01/15/13               [facility] **LIVE**       
         PAGE    1



   RUN TIME: 1638             [address] [place] [address],  [place], [place]   [address]



   Specimen Inquiry



   



   -----------------------------------------------------------------------------
---------------



   Name:  SHANNAN AQUINO                      : 1965    Attend Dr: Roger Phelps MD



   Acct:  [id number]  Unit: [id number]  AGE: 47            Location:  [facility]



   Re13                        SEX: F             Status:    REG REF



   -----------------------------------------------------------------------------
---------------



   



   SPEC:               MIKAYLA: 13-          SUBM DR: Roger Phelps MD



   REQ:  [id number]             RECD: 1147



   STATUS: IONA VOGT DR: Dinorah MIRANDA,[place]



   _



   ORDERED:  LEVEL IV



   



   FINAL DIAGNOSIS



   



   



   Stomach, cardia, biopsy:



   Hyperplastic fundic gland polyp.



   



   



   



   



   CLINICAL HISTORY



   



   Gastroesophageal reflux disease; high dysphagia, pressure pain after meals 
or bending or



   acid coming up



   



   GROSS DESCRIPTION



   



   The specimen is received in formalin labelled Shannan Aquino, Gastric Cardia 
Polyp, and consists



   of a tan, soft tissue fragment measuring 0.7 x 0.5 x 0.3 cm. Submitted 
entirely, one



   cassette.



   



   Signed __________(signature on file)___________ Cruz Vasquez MD 01/15/
13 1638



   



   -----------------------------------------------------------------------------
---------------



   



   



   



   



   



   



   



   



   



   



   



   



   



   



   ** END OF REPORT **



   



   * ML=Testing performed at Main Lab



   DEPARTMENT OF PATHOLOGY,  [facility] [place], [place] [address]



   Phone # [phone]      Fax #[phone]



   Cruz Vasquez M.D. Director     [place] Permit  #[id number]

 

 34  RUN DATE: 01/15/13               [facility] **LIVE**       
         PAGE    1



   RUN TIME: 816             [facility] [place], [place]   [address]



   Specimen Inquiry



   



   -----------------------------------------------------------------------------
---------------



   Name:  SHANNAN AQUINO                      : 1965    Attend Dr: Roger Phelps MD



   Acct:  [id number]  Unit: [id number]  AGE: 47            Location:  [facility]



   Re13                        SEX: F             Status:    REG REF



   -----------------------------------------------------------------------------
---------------



   



   SPEC: [id number]         MIKAYLA:       VALARIE DR: Roger Phelps MD



   REQ:  [id number]              RECD:   



   STATUS: COMP             LASHAHR DR: Douglas Copeland MD



   SOURCE: GAS ANTRUM     SPDESC:



   ORDERED:  Clotest



   



   -----------------------------------------------------------------------------
---------------



   Procedure                         Result                         Verified   
        Site



   -----------------------------------------------------------------------------
---------------



   Clotest  Final                                                   01/15/13-
15      ML



   Clotest                     Negative



   



   -----------------------------------------------------------------------------
---------------



   



   



   



   



   



   



   



   



   



   



   



   



   



   



   



   



   



   



   



   



   



   



   



   



   



   



   



   



   



   ** END OF REPORT **



   



   * ML=Testing performed at Main Lab



   DEPARTMENT OF PATHOLOGY,  [address] [place] [address]



   Phone # [phone]      Fax #[phone]



   Cruz Vasquez M.D. Director     [place] Permit  #[id number]

 

 35  Warning:  A positive result is not useful for establishing a



   diagnosis of syphilis.  In most situations, such a result



   may reflect a prior treated infection; a negative result can



   exclude a diagnosis of syphilis except for incubating or



   early primary disease.

 

 36  no longer our pt,transferred in 

 

 37  Analyte Specific Reagent



   This test was developed and its performance characteristics



   determined by [facility], [facility]. This test has not been cleared or



   approved by the U.S. Food and Drug Administration.



   



   Test Performed by:



   [facility] D[facility] of Lab Med and Pathology



   [address] [place] [id number]



   : Levi Chatman III, M.D.







Procedures







 Date  Code  Description  Status

 

 2018  40749  Omt 3 To 4 Body Regions Involved  Completed

 

 10/16/2018  95497  Biopsy Cervix  Completed

 

 06/15/2016  36338  Pure Tone, Threshold  Completed

 

 2016  12423  Remove Impact Cerumen Irrigation/Lavage Unilateral  
Completed

 

 2015  87730  X-Ray Wrist Three Views  Completed

 

 09/10/2014  82380  Remove Impact Cerumen Requiring Instrument, Unilateral  
Completed

 

 2014  17596  Inj single or mult trigger points  Completed

 

 2012  83107  X-Ray Knee, Complete  Completed

 

 08/10/2012  98937  Electrocardiogram Complete  Completed

 

 2011  22087941  Colonoscopy  Completed







Encounters







 Type  Date  Location  Provider  Dx  Diagnosis

 

 Office Visit  10/05/2018  Main Office  EJ Cesar  D26.0  Other benign



   10:20a        neoplasm of cervix



           uteri









 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

 

 Z12.39  Encounter for oth screening for malignant neoplasm of breast

 

 Z12.4  Encounter for screening for malignant neoplasm of cervix

 

 Z23  Encounter for immunization

 

 Z41.8  Encntr for oth proc for purpose oth [facility]

 

 N95.1  Menopausal and female climacteric states

 

 G47.09  Other insomnia









 Office Visit  2018  3:30p  Main Office  Douglas Copeland  Z12.4  
Encounter for



       M.D.    screening for



           malignant neoplasm



           of cervix









 I10  Essential (primary) hypertension

 

 E06.3  Autoimmune thyroiditis

 

 G43.009  Migraine w/o aura, not intractable, w/o status migrainosus

 

 Z00.01  Encounter for general adult medical exam w abnormal findings

 

 R00.1  Bradycardia, unspecified

 

 Z23  Encounter for immunization

 

 R20.2  Paresthesia of skin

 

 Z71.89  Other specified counseling









 Office Visit  2018 11:00a  Main Office  Karyn Cadena  R20.2  
Paresthesia of skin



       P.A.    









 D48.5  Neoplasm of uncertain behavior of skin

 

 M67.442  Ganglion, left hand

 

 M25.569  Pain in unspecified knee









 Office Visit  2018 11:00a  Main Office  MAXINE Cesar0.2  
Paresthesia of skin



       P.A.    









 M25.521  Pain in right elbow

 

 D48.5  Neoplasm of uncertain behavior of skin

 

 M67.442  Ganglion, left hand

 

 E06.3  Autoimmune thyroiditis

 

 Z80.8  Family history of malignant neoplasm of organs or systems









 Office Visit  2018 10:40a  Main Office  Karyn Cadena,  G47.00  Insomnia,



       P.A.    unspecified









 Z72.820  Sleep deprivation

 

 Z56.3  Stressful work schedule









 Office Visit  2017  3:20p  Main Office  Karyn Cadena,  B02.9  Zoster 
without



       P.A.    complications









 I10  Essential (primary) hypertension

 

 R42  Dizziness and giddiness

 

 T44.7x5A  Adverse effect of beta-adrenoreceptor antagonists, init









 Office Visit  2017  3:40p  Main Office  Karyn Cadena,  B02.9  Zoster 
without



       P.A.    complications

 

 Office Visit  2017  3:40p  Main Office  Karynandrew Cadena,  B02.9  Zoster 
without



       P.A.    complications

 

 Office Visit  2017  2:45p  Main Office  Dinorah  R07.89  Other chest 
pain



       GABE Cole    









 K21.0  Gastro-esophageal reflux disease with esophagitis

 

 R13.12  Dysphagia, oropharyngeal phase

 

 I10  Essential (primary) hypertension









 Office Visit  2017  3:00p  Main Office  Douglas Copeland,  H81.399  Other 
peripheral



       M.D.    vertigo,



           unspecified ear









 R00.1  Bradycardia, unspecified

 

 E06.3  Autoimmune thyroiditis

 

 R07.89  Other chest pain

 

 I10  Essential (primary) hypertension









 Office Visit  2017  3:30p  Main Office  Douglas Copeland,  Z00.00  Encntr 
for



       M.D.    general adult



           medical exam w/o



           abnormal findings









 Z12.31  Encntr screen mammogram for malignant neoplasm of breast

 

 R53.83  Other fatigue

 

 E06.3  Autoimmune thyroiditis

 

 I10  Essential (primary) hypertension









 Office Visit  2017 12:55p  Main Office  Yasmani Lopez,  E53.8  
Deficiency of other



       D.O.    specified B group



           vitamins









 R42  Dizziness and giddiness

 

 E06.3  Autoimmune thyroiditis

 

 H65.22  Chronic serous otitis media, left ear

 

 M54.31  Sciatica, right side









 Office Visit  2016  4:30p  Main Office  Yasmani Lopez,  R42  Dizziness 
and



       D.O.    giddiness









 E06.3  Autoimmune thyroiditis

 

 H65.22  Chronic serous otitis media, left ear

 

 M54.5  Low back pain

 

 R53.83  Other fatigue









 Office Visit  06/15/2016  1:45p  Main Office  Douglas Copeland,  H65.22  
Chronic serous



       M.D.    otitis media,



           left ear









 E06.3  Autoimmune thyroiditis

 

 H61.22  Impacted cerumen, left ear

 

 H65.02  Acute serous otitis media, left ear









 Office Visit  2016  9:15a  Main Office  Douglas Copeland,  H65.22  
Chronic serous



       M.D.    otitis media,



           left ear

 

 Office Visit  2016 11:00a  Main Office  Douglas Copeland,  H61.22  
Impacted cerumen,



       M.D.    left ear









 H65.22  Chronic serous otitis media, left ear

 

 H65.02  Acute serous otitis media, left ear









 Office Visit  2016 11:00a  Main Office  Douglas Copeland,  H65.22  
Chronic serous



       M.D.    otitis media,



           left ear









 M54.5  Low back pain

 

 M79.604  Pain in right leg

 

 M25.551  Pain in right hip

 

 M70.61  Trochanteric bursitis, right hip









 Office Visit  2015  3:00p  Main Office  Yasmani Lopez,  719.43  Pain 
Joint Forearm



       D.O.    

 

 Office Visit  2015  2:00p  Main Office  Douglas Copeland,  245.2  
Thyroiditis Chronic



       M.D.    Lymphocytic









 780.79  Malaise And Fatigue Other

 

 268.9  Vitamin D Deficiency Unspec

 

 V70.0  Examination General Medical Routine AT Health Care Facility

 

 346.10  Migraine Common W/O Intractable W/O Status Migrainosus

 

 401.1  Hypertension Benign

 

 V77.0  Screening Thyroid Disorders

 

 V72.31  Routine Gyn Examination









 Office Visit  09/10/2014 10:45a  Main Office  Sheridan Kimbrough MD  388.9  Ear 
Disorder Unspec









 780.79  Malaise And Fatigue Other

 

 780.4  Dizziness & Giddiness

 

 380.4  Impacted Cerumen









 Office Visit  2014  9:30a  Main Office  Yasmani Lopez,  729.1  Myalgia 
& Myositis



       D.O.    Unspec









 245.2  Thyroiditis Chronic Lymphocytic

 

 780.79  Malaise And Fatigue Other

 

 719.45  Pain Joint Pelvic Region & Thigh









 Office Visit  2014  3:00p  Main Office  Yasmani Lopez,  729.1  Myalgia 
& Myositis



       D.O.    Unspec









 780.79  Malaise And Fatigue Other

 

 719.41  Pain Joint Shoulder Region

 

 268.9  Vitamin D Deficiency Unspec









 Office Visit  2014  4:45p  Main Office  Yasmani Lopez,  729.1  Myalgia 
& Myositis



       D.O.    Unspec









 780.79  Malaise And Fatigue Other

 

 V82.2  Screening For Rheumatic Disorders Other

 

 719.56  Stiffness Joint Not Elsewhere Classified Lower Leg









 Office Visit  2014  9:05a  Main Office  Tito West M.D.  784.0  
Headache









 719.45  Pain Joint Pelvic Region & Thigh

 

 719.41  Pain Joint Shoulder Region









 Office Visit  2014  2:00p  Main Office  Douglas Copeland  V70.0  
Examination General



       M.D.    Medical Routine AT



           Health Care



           Facility









 245.2  Thyroiditis Chronic Lymphocytic

 

 780.97  Altered Mental Status

 

 780.79  Malaise And Fatigue Other









 Office Visit  2013  4:00p  Main Office  Douglas Copeland  719.47  Pain 
Joint Ankle



       M.D.    & Foot









 729.81  Swelling Of Limb









 Office Visit  2013  1:20p  Main Office  Karyn Cadena,  724.9  Back 
Disorders



       P.A.    Other Unspec









 719.45  Pain Joint Pelvic Region & Thigh

 

 553.8  Hernia Other Spec Sites









 Office Visit  2012  4:00p  Main Office  Douglas Copeland  719.46  Pain 
Joint Lower



       M.D.    Leg









 530.81  Esophageal Reflux

 

 386.10  Vertigo Peripheral Unspec

 

 715.96  Osteoarthrosis Unspec Genlzd Or Localized Lower Leg









 Office Visit  2012  3:15p  Main Office  Douglas Copeland M.D.  723.1  
Cervicalgia









 536.8  Stomach Dyspepsia & Other Spec Disorders Of Function

 

 784.1  Throat Pain

 

 719.46  Pain Joint Lower Leg

 

 530.81  Esophageal Reflux

 

 386.10  Vertigo Peripheral Unspec

 

 V65.49  Counseling Other Spec









 Office Visit  08/10/2012  3:30p  Main Office  Douglas Copeland M.D.  727.1  
Bunion









 735.4  Hammer Toe Other Acquired

 

 401.1  Hypertension Benign

 

 346.10  Migraine Common W/O Intractable W/O Status Migrainosus

 

 V72.84  Examination Preoperative Unspec









 Office Visit  2012  3:45p  Main Office  Douglas Copeland  719.47  Pain 
Joint Ankle



       M.D.    & Foot









 727.1  Bunion

 

 695.3  Rosacea

 

 729.1  Myalgia & Myositis Unspec

 

 V65.49  Counseling Other Spec

 

 401.1  Hypertension Benign

 

 346.10  Migraine Common W/O Intractable W/O Status Migrainosus

 

 V06.1  Diphtheria-Tetanus-Pertusis Combined (DTaP)

 

 V07.2  Prophylactic Immunotherapy









 Office Visit  2012  2:30p  Main Office  Douglas Copeland,  719.47  Pain 
Joint Ankle



       M.D.    & Foot









 727.1  Bunion









 Office Visit  2012  3:45p  Main Office  Douglas Copeland,  729.1  Myalgia 
& Myositis



       M.D.    Unspec









 780.52  Insomnia Unspecified

 

 695.3  Rosacea

 

 V65.49  Counseling Other Spec









 Office Visit  2012  3:30p  Main Office  Douglas Copeland,  401.1  
Hypertension Benign



       M.D.    









 346.10  Migraine Common W/O Intractable W/O Status Migrainosus

 

 530.81  Esophageal Reflux

 

 V76.10  Screening For Malignant Neoplasm Breast

 

 729.1  Myalgia & Myositis Unspec

 

 780.52  Insomnia Unspecified

 

 V65.49  Counseling Other Spec









 Office Visit  2004  3:30p  Main Office  Douglas Copeland,  401.1  
Hypertension Benign



       M.D.    

 

 Office Visit  2004  1:15p  Main Office  Douglas Copeland,  401.1  
Hypertension Benign



       M.D.    









 V22.2  Pregnant State Incidental Normal









 Office Visit  2004  2:30p  Main Office  Douglas Copeland,  401.1  
Hypertension Benign



       M.D.    









 462  Pharyngitis Acute







Plan of Treatment

2018 - Yasmani Lopez DBretOBretR11.0 ZhnnbuI92.9 Diaphragmatic hernia without 
obstruction or [id number] HeadacheNew Xrays:MRI Brain Without Contrast, Ordered
: 18M99.00 Segmental and somatic dysfunction of head eqidptW45.01 
Segmental and somatic dysfunction of cervical cddipcQ48.02 Segmental and 
somatic dysfunction of thoracic fxpwqxB93.08 Segmental and somatic dysfunction 
of rib cage

## 2019-01-07 NOTE — ED
Headache





- HPI Summary


HPI Summary: 





A 54 y/o female presents to [facility] with a chief complaint of a gradually 

worsening Headache since 12/11/18. She describes her HA as throbbing and claims 

that the location of her pain is frontal. She rates her pain as a 4/10, 

sometimes shooting up to a 6/10. She reports some photophobia and claims that 

people talking sometimes aggravates her pain, stabbing eye pain and joint 

stiffness. She also reports that she loses her balance. She claims that she has 

had migraines since her 20s, in which she wakes up with nausea, but then feels 

fine later in the day. She also reports vertigo since 2009. She reports that 

she quit drinking coffee 2-3 months PTA. She reports, I feel like I cant 

function properly. She denies Fever, Chills, Erythema (eyes), Sore throat, 

Chest pain, Shortness of Breath, Cough, Abdominal pain, Vomiting, Dysuria, 

Hematuria, Myalgia, Edema, Rash and Dizziness





- History Of Current Complaint


Chief Complaint: EDHeadache


Stated Complaint: HEADACHE FOR MONTH/LIGHTHEADED


Time Seen by Provider: 01/07/19 10:34


Hx Obtained From: Patient


Hx Last Menstrual Period: ablasion/tubal ligation 2005


Onset/Duration: Gradual Onset, Started weeks ago, Still Present


Initially Headache Was: Initial Pain Scale(0-10)= - 4


Currently Pain Is: Current Pain Scale(0-10)= - 4/10 spiking up to 6/10


Timing: Weeks


Character: Throbbing


Location of Headache: Frontal


Aggravating Factor: Bright Lights


Associated Signs And Symptoms: Nausea





- Allergies/Home Medications


Allergies/Adverse Reactions: 


 Allergies











Allergy/AdvReac Type Severity Reaction Status Date / Time


 


erythromycin base AdvReac Mild GI Upset Verified 01/07/19 10:33





[From Erythrocin]     


 


envirmental Allergy Mild runny nose Uncoded 01/07/19 09:09





   and eyes  











Home Medications: 


 Home Medications





Magnesium Oxide TAB* [MagOx 400 TAB*] 400 mg PO DAILY 01/07/19 [History 

Confirmed 01/07/19]


Omeprazole CAP (NF) [Prilosec CAP* 20 MG] 40 mg PO DAILY 01/07/19 [History 

Confirmed 01/07/19]


Propranolol TAB* [Inderal TAB*] 20 mg PO BID 01/07/19 [History Confirmed 01/07/ 19]


traZODone TAB* [Desyrel TAB*] 50 - 100 mg PO BEDTIME 01/07/19 [History 

Confirmed 01/07/19]











PMH/Surg Hx/FS Hx/Imm Hx


Endocrine/Hematology History: Reports: Hx Thyroid Disease - hashimotos


   Denies: Hx Anticoagulant Therapy, Hx Diabetes


Cardiovascular History: Reports: Hx Hypertension


   Denies: Hx Congestive Heart Failure, Hx Deep Vein Thrombosis, Hx Myocardial 

Infarction, Hx Pacemaker/ICD


Respiratory History: 


   Denies: Hx Asthma, Hx Chronic Obstructive Pulmonary Disease (COPD), Hx Lung 

Cancer, Hx Pneumonia, Hx Pulmonary Embolism


GI History: Reports: Hx Ulcer - gerd


   Denies: Hx Gall Bladder Disease, Hx Gastrointestinal Bleed, Hx Urosepsis


 History: Reports: Hx Kidney Stones - None causing a problem at this time., 

Other  Problems/Disorders - hx stones per pt


   Denies: Hx Dialysis, Hx Renal Disease


Musculoskeletal History: Reports: Other Musculoskeletal History - Sciatica


Sensory History: 


   Denies: Hx Hearing Aid


Neurological History: Reports: Hx Migraine, Other Neuro Impairments/Disorders - 

vertigo hx occassional


   Denies: Hx Dementia, Hx Seizures, Hx Transient Ischemic Attacks (TIA)


Psychiatric History: 


   Denies: Hx Anxiety, Hx Depression, Hx Panic Disorder, Hx Schizophrenia, Hx 

Bipolar Disorder





- Cancer History


Hx Chemotherapy: No


Hx Radiation Therapy: No





- Surgical History


Surgery Procedure, Year, and Place: tubal ligation; right foot; left shoulder; 

septal plasty; ENDOMETRIAL ABLATION;.  left ear drum graft (no metal implanted- 

skin graft)


Infectious Disease History: No


Infectious Disease History: 


   Denies: Hx Clostridium Difficile, Hx Hepatitis, Hx Human Immunodeficiency 

Virus (HIV), Hx of Known/Suspected MRSA, Hx Shingles, Hx Tuberculosis, Traveled 

Outside the [place] in Last 30 Days





- Family History


Known Family History: Positive: Cardiac Disease - mother CHF, brother CHF, 

Hypertension


   Negative: Diabetes





- Social History


Alcohol Use: None


Hx Substance Use: No


Substance Use Type: Reports: None


Smoking Status (MU): Former Smoker





Review of Systems


Negative: Fever, Chills


Eyes: Other - positive: stabbing eye pain


Positive: Photophobia


Positive: Sore Throat


Negative: Chest Pain


Negative: Shortness Of Breath, Cough


Positive: Nausea.  Negative: Abdominal Pain, Vomiting


Negative: dysuria, hematuria


Negative: Myalgia, Edema


Negative: Rash


Neurological: Negative - dizziness


Positive: Headache


All Other Systems Reviewed And Are Negative: Yes





Physical Exam





- Summary


Physical Exam Summary: 





Constitutional: Well-developed, Well-nourished, Alert. (-) Distressed


Skin: Warm, Dry


HENT: Normocephalic; Atraumatic 


Eyes: Conjunctiva normal


Neck: Musculoskeletal ROM normal neck. (-) JVD, (-) Stridor, (-) Tracheal 

deviation


Cardio: Rhythm regular, rate normal, Heart sounds normal; Intact distal pulses; 

The pedal pulses are 2+ and symmetric. Radial pulses are 2+ and symmetric. (-) 

Murmur


Pulmonary/Chest wall: Effort normal. (-) Respiratory distress, (-) Wheezes, (-) 

Rales


Abd: Soft. (-) Tenderness, (-) Distension, (-) Guarding, (-) Rebound


Musculoskeletal: (-) Edema


Lymph: (-) Cervical adenopathy


Neuro: Alert, Oriented x3, Strength normal, Cranial nerves II-XII are grossly 

intact. (-) Dysmetria, (-) Nystagmus, (-) Ataxia by finger to nose testing, (-) 

Sensory deficit.


Psych: Mood and affect Normal


Triage Information Reviewed: Yes


Vital Signs On Initial Exam: 


 Initial Vitals











Temp Pulse Resp BP Pulse Ox


 


 98.2 F   58   16   142/75   100 


 


 01/07/19 10:30  01/07/19 10:30  01/07/19 10:30  01/07/19 10:30  01/07/19 10:30











Vital Signs Reviewed: Yes





- [place] Coma Scale


Best Eye Response: 4 - Spontaneous


Best Motor Response: 6 - Obeys Commands


Best Verbal Response: 5 - Oriented


Coma Scale Total: 15





Diagnostics





- Vital Signs


 Vital Signs











  Temp Pulse Resp BP Pulse Ox


 


 01/07/19 10:30  98.2 F  58  16  142/75  100














- Laboratory


Lab Statement: Any lab studies that have been ordered have been reviewed, and 

results considered in the medical decision making process.





- CT


  ** Brain


CT Interpretation Completed By: Radiologist


Summary of CT Findings: NO EVIDENCE FOR ACUTE INTRACRANIAL ABNORMALITY.  ED 

physician has reviewed this imaging report.





Re-Evaluation





- Re-Evaluation


  ** First Eval


Re-Evaluation Time: 14:07


Change: Improved


Comment: Her headache is feeling better





Headache Course/Dx





- Course


Course Of Treatment: A 54 y/o female presents to [facility] with a chief complaint 

of a gradually worsening Headache since 12/11/18. She describes her HA as 

throbbing and claims that the location of her pain is frontal. [place] is 

unremarkable. The brain CT was negative. There have been no thunderclap 

headaches. Her outpatient therapy has been inadequate. She has been taking 

ibuprofen too infrequently for her symptoms to be related. She will be gait 

tested before being discharged and advised not to drive home from the ED and to 

use caution when driving. In the ED course the patient was given Benadryl, 

Toradol and Reglan IV. Upon re-eval her headache is feeling better. She will be 

discharged with a prescription for Naprosyn. She was instructed to keep her MRI 

appointment, follow up with her PCP in 2-3 days, and return to the ED if she 

experiences any changing or worsening symptoms. The patient is agreeable with 

this plan.





- Diagnoses


Provider Diagnoses: 


 Headache








Discharge





- Sign-Out/Discharge


Documenting (check all that apply): Patient Departure - DC





- Discharge Plan


Condition: Stable


Disposition: HOME


Prescriptions: 


Naproxen TAB* [Naprosyn 250 mg TAB*] 500 mg PO Q8H PRN #30 tab


 PRN Reason: Headache/Discomfort


Patient Education Materials:  Acute Headache (ED)


Forms:  *Work Release


Referrals: 


Douglas Copeland MD [Primary Care Provider] -  (2-3 days)


Additional Instructions: 


Follow up with your PCP in 2-3 days.


Keep your MRI appointment. 


RETURN TO THE EMERGENCY DEPARTMENT FOR CHANGING OR WORSENING SYMPTOMS





- Billing Disposition and Condition


Condition: STABLE


Disposition: Home





- Attestation Statements


Document Initiated by Scribe: Yes


Documenting Scribe: Jason Nolan


Provider For Whom Feng is Documenting (Include Credential): Trey Vallejo MD


Scribe Attestation: 


Jason CUEVAS, scribed for Trey Vallejo MD on 01/07/19 at 1715. 


Scribe Documentation Reviewed: Yes


Provider Attestation: 


The documentation as recorded by the Jason lujan accurately 

reflects the service I personally performed and the decisions made by me, Trey Vallejo MD


Status of Scribe Document: Viewed

## 2019-01-07 NOTE — XMS REPORT
Continuity of Care Document (CCD)

 Created on:2018



Patient:Shannan Aquino

Sex:Female

:1965

External Reference #:[id number]





Demographics







 Address  [address] [place] [address]

 

 Home Phone  [phone]

 

 Mobile Phone  [phone]

 

 Work Phone  [phone]

 

 Email Address  [email or web address]

 

 Preferred Language  en

 

 Marital Status  Not  or 

 

 Buddhist Affiliation  Unknown

 

 Race  White

 

 Ethnic Group  Not  or 









Author







 Name  Yasmani Lopez D.O.

 

 Address  [address] [place] [id number]









Support







 Name  Relationship  Address  Phone

 

 Tito Newman  Domestic/Life Partner  Unavailable  [phone]









Care Team Providers







 Name  Role  Phone

 

 HCP given  Primary Care Physician  Unavailable









Payers







 Type  Date  Identification Numbers  Payment Provider  Subscriber

 

   Effective:  Policy Number: [id number]  [employer]  Shannan Aquino



   2017    Plan  









 PayID: [id number]  [address] [address]









 [place] [address]









   Effective: 2014  Policy Number:  [employer] [facility]  Shannan Aquino



     [id number]    









 Expires: 2017  PayID: [id number]  [address] [address]









 [place] [address]







Advance Directives







 Description

 

 No Information Available







Problems







 Date  Description  Provider  Status

 

 Onset: 2012  Benign essential hypertension  Douglas Copeland M.D.  Active

 

 Onset: 2012  Migraine without aura, not refractory  Douglas Copeland M.D.  Active

 

 Onset: 2012  Gastroesophageal reflux disease  Douglas Copeland M.D.  
Active

 

 Onset: 2012  Myalgia & Myositis Unspecified  Douglas Copeland M.D.  
Active

 

 Onset: 2012  Rosacea  Douglas Copeland M.D.  Active

 

 Onset: 2012  Arthralgia of the lower leg  Douglas Copeland M.D.  Active

 

 Onset: 2014  Hashimoto thyroiditis  Douglas Copeland M.D.  Active

 

 Onset: 2014  Malaise and fatigue  Yasmani Lopez D.O.  Active

 

 Onset: 2014  Arthralgia of the pelvic region and  Yasmani Lopez D.O.  
Active



   thigh    

 

 Onset: 2017  Sciatica  Yasmani Lopez D.O.  Active

 

 Onset: 2017  Essential hypertension  Douglas Copeland M.D.  Active







Family History







 Date  Family Member(s)  Problem(s)  Comments

 

   Father  Alcoholism  

 

   Father   due to Cancer  () - Malignant



       Melanoma, age 55

 

   Father  Cancer, Melanoma  

 

   Mother  High Blood Pressure  

 

 :  (age 70  Mother   due to CHF  



 Years)      

 

   Children  2 sons  

 

   First Brother  High Blood Pressure  

 

   First Brother  General Health Good  

 

   Second Brother  General Health Good  

 

 :  (age 45  Third Brother   due to CHF  



 Years)      

 

   Third Brother  High Blood Pressure  







Social History







 Type  Date  Description  Comments

 

 Birth Sex    Unknown  

 

 Education    Highest Level  



     Completed College  

 

 Marital Status      

 

 Lives With    Boyfriend  

 

 Lives With    Son  

 

 Smoke-Free    Home is smoke-free  

 

 Work Status  2018  Currently Working  cleaning; at [employer]



       Access Closure

 

 Tobacco Use  Start: Unknown  Former Cigarette  quit in  ("only



   End: Unknown  Smoker  smoked for a couple



       of years")

 

 Tobacco Use  Start: Unknown  Non Smoker  

 

 Smoking Status  Reviewed: 18  Non Smoker  

 

 Exercise Type/Frequency  2018  Exercises  walks a lot for work,



       easily >10K steps/d.



       Also walks/runs on



       her own

 

 Sun Exposure    minimum amount of sun  



     exposure  

 

 Sun Exposure    Uses sunscreen  

 

 Seat Belt/Car Seat    always uses seat belt  

 

 Currently Active    Patient is currently  



     sexually active  

 

 Contraceptive Methods    None  

 

 Age 1st Boaz    19 Years Old  

 

 # Partners in a Lifetime    8  







Allergies, Adverse Reactions, Alerts







 Description

 

 No Known Drug Allergies







Medications







 Medication  Date  Status  Form  Strength  Qnty  SIG  Indications  Ordering



                 Provider

 

 Propranolol  /  Active  Tablets  20mg  60tabs  take 1  G43.009  Silcoff,



 HCL  2018          tablet by    el Cole twice    M.D.



             a day for    



             high blood    



             pressure and    



             Headache    



             Prevention    

 

 Trazodone HCL  /  Active  Tablets  50mg  60tabs  Take One To  G47.00  
co2018          Two Tablets    Douglas



             By Mouth AT    M.D.



             Bedtime For    



             Trouble    



             Sleeping    

 

 Selenium  /  Active  Capsules  200mcg    1 q am    Unknown



                 

 

 Omeprazole  /  Active  Capsules  40mg  30caps  take 1  K21.9  Dinorah



       DR      capsule by    Douglas,



             mouth once    M.D.



             daily for    



             Acid Reflux    









 R07.0

 

 K21.0









 Meclizine HCL  2017  Active  Tablets  25mg    take 1 tablet    Unknown



             by mouth three    



             times a day if    



             needed for    



             dizziness    

 

 Multivitamin  2017  Active  Tablets      1 by mouth    Unknown



 Adult            every day    

 

 Ra Vitamin D-3  2016  Active  Capsules  5000Uni  90c  1 tab by mouth  
E55  Sopchak,



         t  aps  every day  or 7  .9  Yasmani,



             tabs once a    D.O.



             week    

 

 Wrist Splint  2015  Active  Misc    1un  fit to patient  719  Sopchak,



 Elastic/Small/Me          its  to assist in  .43  Yasmani,



 dium            wrist    D.O.



             extension. use    



             as directed for    



             wrist pain    

 

 Metronidazole  2012  Active  Cream  0.75%  45u  apply twice a  695  
Silcoff,



           nit  day to affected  .3  marshall Cole  areas on face    M.D.



             (for acne    



             rosacea)    

 

 Vitamin B    Active  Tablets      1 by mouth    Unknown



 Complex            twice daily    

 

                 

 

 Acetaminophen-Co  2017 -  Hx  Tablets  300-30m  20t  take one tid  B02  
Silcoff



 deine #3  2018      g  abs  for pain, prn  .9  GABE Cole

 

 Metaxalone  2017 -  Hx  Tablets  800mg  30t  1 tid  B02  Silcoff,



   2018        abs    .9  GABE Cole

 

 Valtrex  2017 -  Hx  Tablets  1gm  21t  1 tab tid  B02  Silcoff,



   2017        abs    .9  GABE Cole

 

 Ibuprofen  2017 -  Hx  Tablets  600mg  60t  1 by mouth  B02  Silcoff,



   10/31/2017        abs  every 6 hours  .9  Douglas,



             as needed for    M.D.



             pain    

 

 Gabapentin  2017 -  Hx  Capsules  100mg  90c  1 tabs by mouth  R42  
Sopchak,



   2017        aps  three times a    Yasmani,



             day    D.O.

 

 Fluticasone  2017 -  Hx  Suspension  50mcg/A  48u  Inhale 2 sprays  H65  
Sopchak,



 Propionate  2018      ct  nit  into nostril  .22  Yasmani,



           s  daily in each    D.O.



             nostril for    



             nose    



             inflammation    

 

 Amoxicillin  2016 -  Hx  Tablets  500mg  30t  1 by mouth  H65  Silcoff,



   2016        abs  three times a  .02  kapil Cole for 10    M.D.



             days, for ear    



             infection    

 

 PT For Low Back,  2016 -  Hx        evaluate and  M54  Silcoff,



 Right Hip And  2017          treat, instruct  .5  Douglas



 Right Leg Pain            in hep,    MDUANE.



             modalities prn    



             (combination of    



             lbp, troch    



             bursitis,    



             possible hip    



             joint    



             pathology)    









 M25.551

 

 M70.61









 Ra Vitamin D-3  2015 -  Hx  Capsules  5000Unit  90caps  1 tab by    
Sopsammy,



   2015          mouth every    Yasmani, D.O.



             day  or 7    



             tabs once a    



             week    

 

 Vitamin D3  2015 -  Hx  Capsules  1000Unit  OTC  1 by mouth  268  Silcokatharina
,



   2016          every day  .9  GABE Cole

 

 Propranolol HCL  2015 -  Hx  Tablets  60mg  60tabs  take   G43  
Silcoff,



   2018          tablet by  .00  el Cole twice a  9  M.D.



             day for blood    



             pressure and    



             Migraine    



             Headaches    

 

 Ciprodex  09/10/2014 -  Hx  Suspension  0.3-0.1%  10cc  4 drops in  388  Luis E
,



   2015          both ears  .9  Sheridan MIRANDA



             twice a day    



             for 7 days    



             stop and call    



             if you are no    



             better in 48    



             hours    

 

 Off Work  09/10/2014 -  Hx        until monday  388  Luis E,



   2015          the 15th due  .9  Sheridan MIRANDA



             to acute    



             illness    

 

 Vitamin D-3  2014 -  Hx  Tablets  5000Unit  90tabs  1 tab by  268  
Sopsammy,



   2015          mouth every  .9  Yasmani, D.O.



             day  or 7    



             tabs once a    



             week    

 

 Ankle Lace-Up  2013 -  Hx  Misc    1units  for left  719  Dinorah



 Brace  2014          ankle  .47  GABE Cole

 

 PT For Left  2013 -  Hx        please  719  Silcoff,



 Ankle And Foot  2014          evaluae and  .47  Douglas



 Pain And Ankle            treat,    MRANDY



 Instability            instruct i    



             hep,    



             modalities    



             prn    

 

 Famotidine  2013 -  Hx  Tablets  40mg  90tabs  take 1 tablet  K21  
Silcoff,



   2017          by mouth  .9  Douglas,



             Every Night    M.D.



             For Acid    



             Reflux    









 R07.0

 

 K21.0









 PT For  2012 -  Hx        please evaluate  719.46  Dinorah,



 Bilateral Knee  2014          and treatDouglas M.D.



 Pain            instruct in    



             hep, modalities    



             prn    

 

 Famotidine  2012 -  Hx  Tablets  20mg  30tab  Take 1 Tablet  530.81  
Silcokatharina,



   2013        s  Every Evening    GABE Cole



             For Acid    



             Reflux.    









 784.1

 

 536.8









 Metronidazole  2012 -  Hx  Gel  0.75%  45gm  apply to  695.3  Silcoff,



   2012          affected    Douglas



             areas on    M.D.



             face twice    



             a day (for    



             acne    



             rosacea)    

 

 Nortriptyline HCL  2012 -  Hx  Capsules  10mg  100caps  1 pill  729.1  
Silcoff,



   2012          nightly (30    Douglas,



             minutes    M.D.



             before bed)    



             to start;    



             inc by 1    



             tablet    



             weekly if    



             needed; max    



             5    



             pills/night    









 780.52









 Flonase  2012 -  Hx  Suspension  50mcg/Act    2 sprays  477.9  Unknown



   2012          each nostril    



             once daily    



             as needed    



             for    



             allergies    

 

 Propranolol HCL  2012 -  Hx  Tablets  80mg  60ta  take 1  401.1  Silcoff,



   2015        bs  tablet twice    Douglas,



             a day for    M.D.



             blood    



             pressure and    



             migraines    









 346.10









 Protonix  2011 -  Hx  Tablets  40mg  30tabs  1 by mouth every  530.81  
Silcoff,



   2014    DR dick for acid    Douglas,



             reflux/swallowing    M.D.



             difficulties    

 

 Aldomet  2004 -  Hx  Tablets  250mg  30tabs  1 po qhs to start.  401.1  
Silcoff,



   2012          We may need to    Douglas,



             increase it to    M.D.



             twice a day if your    



             blood pressure    



             remains elevated    

 

 Norvasc  2004 -  Hx  Tablets  5mg  90tabs  1 PO qd For High  401.1  
Silcoff,



   2004          Blood Pressure    GABE Cole

 

 Metoprolol  2004 -  Hx  Tablets  50mg  90tabs  1/2 PO bid  401.1  Silcoff
,



   2004              GABE Cole

 

 Vitamin B   -  Hx  Tablets      1 by mouth once    Unknown



 Complex  2016          daily    







Immunizations







 CPT Code  Status  Date  Vaccine  Lot #

 

 [id number]  Given  10/05/2018  Influenza Virus Vaccine, Quadrivalent, Split,  [id number]



       Preservative Free  

 

 50792  Given  2012  Adacel or Boostrix, TDaP  [id number]

 

 82031  Given  2003  Flu, Split Virus, 2-35 Mo Dose  









 









 34949  Refused  2012  Flu, Split Virus 3Yrs  

 

 26838  Refused  2012  Flu, Split Virus 3Yrs  







Vital Signs







 Date  Vital  Result  Comment

 

 2018 11:46am  BP Systolic  118 mmHg  









 BP Diastolic  80 mmHg  

 

 Weight  147.00 lb  









 10/16/2018  8:51am  BP Systolic  112 mmHg  









 BP Diastolic  78 mmHg  









 10/05/2018 10:30am  BP Systolic  110 mmHg  









 BP Diastolic  70 mmHg  

 

 Weight  149.00 lb  









 2018  4:07pm  BP Systolic  104 mmHg  









 BP Diastolic  64 mmHg  

 

 Heart Rate  52 /min  reg

 

 Respiratory Rate  12 /min  not laboured

 

 Height  62.50 inches  5'2.50"

 

 Weight  152.00 lb  

 

 BMI (Body Mass Index)  27.4 kg/m2  









 2018 11:11am  BP Systolic  116 mmHg  









 BP Diastolic  68 mmHg  

 

 Height  63 inches  5'3"

 

 Weight  156.00 lb  154 at home

 

 BMI (Body Mass Index)  27.6 kg/m2  









 2018 11:05am  BP Systolic  104 mmHg  









 BP Diastolic  70 mmHg  









 2018 10:46am  BP Systolic  118 mmHg  









 BP Diastolic  80 mmHg  

 

 Height  63 inches  5'3"

 

 Weight  158.00 lb  

 

 BMI (Body Mass Index)  28.0 kg/m2  









 2017  3:33pm  BP Systolic  132 mmHg  









 BP Diastolic  80 mmHg  

 

 Heart Rate  70 /min  









 2017  3:40pm  BP Systolic  110 mmHg  









 BP Diastolic  70 mmHg  

 

 Body Temperature  98.0 F  









 2017  3:47pm  BP Systolic  120 mmHg  









 BP Diastolic  80 mmHg  

 

 Body Temperature  98.3 F  

 

 Weight  174.00 lb  w/shoes









 2017  2:42pm  BP Systolic  104 mmHg  









 BP Diastolic  68 mmHg  

 

 Weight  176.00 lb  









 2017  3:34pm  BP Systolic  102 mmHg  









 BP Diastolic  68 mmHg  

 

 BP Systolic Recheck  118 mmHg  R arm sitting

 

 BP Diastolic Recheck  80 mmHg  R arm sitting

 

 Heart Rate  56 /min  reg

 

 Weight  174.00 lb  w/shoes









 2017  4:01pm  BP Systolic  110 mmHg  









 BP Diastolic  70 mmHg  

 

 Height  62.75 inches  5'2.75"

 

 Weight  173.00 lb  

 

 BMI (Body Mass Index)  30.9 kg/m2  









 2017 12:57pm  BP Systolic  118 mmHg  









 BP Diastolic  76 mmHg  

 

 Weight  173.00 lb  









 2016  4:38pm  BP Systolic  120 mmHg  









 BP Diastolic  80 mmHg  

 

 Height  62.75 inches  5'2.75"

 

 Weight  173.00 lb  

 

 BMI (Body Mass Index)  30.9 kg/m2  









 06/15/2016  1:46pm  BP Systolic  116 mmHg  









 BP Diastolic  80 mmHg  









 2016  9:09am  BP Systolic  110 mmHg  









 BP Diastolic  80 mmHg  

 

 Weight  170.00 lb  









 2016 11:40am  BP Systolic  110 mmHg  









 BP Diastolic  78 mmHg  

 

 Body Temperature  98.8 F  

 

 Weight  173.00 lb  









 2016 10:59am  BP Systolic  128 mmHg  









 BP Diastolic  88 mmHg  

 

 Body Temperature  98.8 F  

 

 Height  63 inches  5'3"

 

 Weight  172.00 lb  

 

 BMI (Body Mass Index)  30.5 kg/m2  









 2015  3:33pm  BP Systolic  111 mmHg  









 BP Diastolic  73 mmHg  

 

 Heart Rate  56 /min  

 

 Weight  174.00 lb  w/shoes









 2015  2:32pm  BP Systolic  116 mmHg  









 BP Diastolic  70 mmHg  

 

 Heart Rate  56 /min  reg

 

 Respiratory Rate  12 /min  not laboured

 

 Height  63 inches  5'3"

 

 Weight  170.00 lb  

 

 BMI (Body Mass Index)  30.1 kg/m2  









 09/10/2014 11:03am  BP Systolic  110 mmHg  









 BP Diastolic  68 mmHg  

 

 Body Temperature  98.7 F  

 

 Weight  166.00 lb  









 2014  9:24am  BP Systolic  140 mmHg  









 BP Diastolic  88 mmHg  

 

 Height  63.5 inches  5'3.50" shoes on

 

 Weight  166.00 lb  shoes on

 

 BMI (Body Mass Index)  28.9 kg/m2  









 2014  3:13pm  BP Systolic  130 mmHg  









 BP Diastolic  90 mmHg  

 

 Weight  166.00 lb  shoes on









 2014  4:40pm  BP Systolic  118 mmHg  









 BP Diastolic  70 mmHg  









 2014  9:18am  BP Systolic  120 mmHg  









 BP Diastolic  80 mmHg  

 

 Body Temperature  98.3 F  

 

 Weight  168.00 lb  









 2014  2:17pm  BP Systolic  102 mmHg  









 BP Diastolic  74 mmHg  

 

 Heart Rate  60 /min  reg

 

 Respiratory Rate  12 /min  not laboured

 

 Height  62.50 inches  5'2.50"

 

 Weight  164.00 lb  

 

 BMI (Body Mass Index)  29.5 kg/m2  









 2013  4:18pm  BP Systolic  110 mmHg  









 BP Diastolic  80 mmHg  

 

 Weight  171.00 lb  









 2013  1:21pm  BP Systolic  135 mmHg  ????









 BP Diastolic  122 mmHg  ????

 

 BP Systolic Recheck  128 mmHg  

 

 BP Diastolic Recheck  72 mmHg  

 

 Heart Rate  59 /min  60

 

 Body Temperature  98.4 F  

 

 Weight  171.00 lb  









 2012  4:15pm  BP Systolic  118 mmHg  









 BP Diastolic  72 mmHg  

 

 Weight  170.00 lb  









 2012  3:52pm  BP Systolic  114 mmHg  









 BP Diastolic  70 mmHg  

 

 Body Temperature  98.2 F  

 

 Weight  170.00 lb  









 08/10/2012  4:46pm  BP Systolic  112 mmHg  









 BP Diastolic  76 mmHg  

 

 Heart Rate  56 /min  reg

 

 Respiratory Rate  12 /min  not laboured

 

 Height  63 inches  5'3"

 

 Weight  167.00 lb  

 

 BMI (Body Mass Index)  29.6 kg/m2  









 2012  4:05pm  BP Systolic  108 mmHg  









 BP Diastolic  70 mmHg  

 

 Height  63 inches  5'3"

 

 Weight  168.00 lb  

 

 BMI (Body Mass Index)  29.8 kg/m2  









 2012  2:57pm  BP Systolic  110 mmHg  









 BP Diastolic  80 mmHg  

 

 Weight  167.00 lb  

 

 Last Menstrual Period  0  









 2012  3:44pm  BP Systolic  116 mmHg  









 BP Diastolic  78 mmHg  

 

 Weight  169.00 lb  









 2012  4:01pm  BP Systolic  124 mmHg  









 BP Diastolic  80 mmHg  

 

 Heart Rate  72 /min  reg

 

 Respiratory Rate  12 /min  not laboured

 

 Height  62.75 inches  5'2.75"

 

 Weight  167.00 lb  

 

 BMI (Body Mass Index)  29.8 kg/m2  

 

 Last Menstrual Period  0  2004  3:21pm  BP Systolic  138 mmHg  









 BP Diastolic  82 mmHg  

 

 BP Systolic Recheck  138 mmHg  R arm sitting

 

 BP Diastolic Recheck  78 mmHg  R arm sitting

 

 Weight  150.00 lb  

 

 Last Menstrual Period  0  









 2004  1:13pm  BP Systolic  152 mmHg  









 BP Diastolic  84 mmHg  

 

 BP Systolic Recheck  142 mmHg  R arm sitting

 

 BP Diastolic Recheck  94 mmHg  R arm sitting

 

 Weight  145.00 lb  

 

 Last Menstrual Period  [id number]  









 2004  2:43pm  BP Systolic  162 mmHg  R arm sitting









 BP Diastolic  100 mmHg  R arm sitting

 

 Respiratory Rate  12 /min  not laboured

 

 Body Temperature  99.7 F  

 

 Weight  140.00 lb  







Results







 Test  Date  Facility  Test  Result  H/L  Range  Note

 

 Laboratory test  2018  [facility]  TSH  4.60 mcIU/mL  N  0.34-5.60  



 finding    [phone]  (Thyroid        



       Stim Horm)        

 

 CBC Auto Diff  2018  [facility]  White Blood  6.4 10^3/uL  N  3.5-
10.8  



     [phone]  Count        









 Red Blood Count  4.49 10^6/uL  N  4.00-5.40  

 

 Hemoglobin  12.8 g/dL  N  12.0-16.0  

 

 Hematocrit  39 %  N  35-47  

 

 Mean Corpuscular Volume  86 fL  N  80-97  

 

 Mean Corpuscular Hemoglobin  28 pg  N  27-31  

 

 Mean Corpuscular HGB Conc  33 g/dL  N  31-36  

 

 Red Cell Distribution Width  14 %  N  10.5-15  

 

 Platelet Count  207 10^3/uL  N  150-450  

 

 Mean Platelet Volume  9.2 fL  N  7.4-10.4  

 

 Abs Neutrophils  4.7 10^3/uL  N  1.5-7.7  

 

 Abs Lymphocytes  1.2 10^3/uL  N  1.0-4.8  

 

 Abs Monocytes  0.3 10^3/uL  N  0-0.8  

 

 Abs Eosinophils  0.2 10^3/uL  N  0-0.6  

 

 Abs Basophils  0 10^3/uL  N  0-0.2  

 

 Abs Nucleated RBC  0 10^3/uL      

 

 Granulocyte %  73.1 %      

 

 Lymphocyte %  19.1 %      

 

 Monocyte %  4.7 %      

 

 Eosinophil %  2.7 %      

 

 Basophil %  0.4 %      

 

 Nucleated Red Blood Cells %  0      









 Comp Metabolic Panel  2018  [facility]  Sodium  141 mmol/L  N  135-
145  



     [phone]          









 Potassium  4.4 mmol/L  N  3.5-5.0  

 

 Chloride  103 mmol/L  N  101-111  

 

 Co2 Carbon Dioxide  33 mmol/L  High  22-32  

 

 Anion Gap  5 mmol/L  N  2-11  

 

 Glucose  76 mg/dL  N    

 

 Blood Urea Nitrogen  16 mg/dL  N  6-24  

 

 Creatinine  1.02 mg/dL  High  0.51-0.95  

 

 BUN/Creatinine Ratio  15.7  N  8-20  

 

 Calcium  9.1 mg/dL  N  8.6-10.3  

 

 Total Protein  6.3 g/dL  Low  6.4-8.9  

 

 Albumin  4.0 g/dL  N  3.2-5.2  

 

 Globulin  2.3 g/dL  N  2-4  

 

 Albumin/Globulin Ratio  1.7  N  1-3  

 

 Total Bilirubin  0.40 mg/dL  N  0.2-1.0  

 

 Alkaline Phosphatase  84 U/L  N    

 

 Alt  20 U/L  N  7-52  

 

 Ast  17 U/L  N  13-39  

 

 Egfr Non-  57.1    >60  

 

 Egfr   69.1    >60  1









 Laboratory test finding  2018  [facility]  Magnesium  1.9 mg/dL  N  
1.9-2.7  



     [phone]          









 Vitamin B12  809 pg/mL  N  180-914  2









 Laboratory test  10/16/2018  [facility]  Surgical  SEE RESULT      3



 finding    [phone]  Pathology  BELOW      

 

 Laboratory test  10/05/2018  [facility]  Cytology  SEE RESULT      4



 finding    [phone]    BELOW      

 

 Laboratory test  2018  [facility]  TSH (Thyroid Stim  5.50 mcIU/mL  
N  0.34-5.  



 finding    [phone]  Horm)      60  









 T3 Free  3.00 pg/mL  N  2.5-3.9  

 

 Free T4 (Free Thyroxine)  0.82 ng/dL  N  0.61-1.12  

 

 Thyroxine  8.68 g/mL  N  6.09-12.23  









 Laboratory test  2018  [facility]  TSH (Thyroid  5.62 mcIU/mL  High  
0.34-5.60  



 finding    [phone]  Stim Horm)        

 

 CBC Auto Diff  2018  [facility]  White Blood  6.7 10^3/uL  N  3.5-
10.8  



     [phone]  Count        









 Red Blood Count  4.75 10^6/uL  N  4.0-5.4  

 

 Hemoglobin  13.5 g/dL  N  12.0-16.0  

 

 Hematocrit  41 %  N  35-47  

 

 Mean Corpuscular Volume  85 fL  N  80-97  

 

 Mean Corpuscular Hemoglobin  28 pg  N  27-31  

 

 Mean Corpuscular HGB Conc  33 g/dL  N  31-36  

 

 Red Cell Distribution Width  14 %  N  10.5-15  

 

 Platelet Count  196 10^3/uL  N  150-450  

 

 Mean Platelet Volume  10 um3  N  7.4-10.4  

 

 Abs Neutrophils  4.8 10^3/uL  N  1.5-7.7  

 

 Abs Lymphocytes  1.3 10^3/uL  N  1.0-4.8  

 

 Abs Monocytes  0.4 10^3/uL  N  0-0.8  

 

 Abs Eosinophils  0.2 10^3/uL  N  0-0.6  

 

 Abs Basophils  0 10^3/uL  N  0-0.2  

 

 Abs Nucleated RBC  0 10^3/uL      

 

 Granulocyte %  71.6 %  N  38-83  

 

 Lymphocyte %  19.4 %  Low  25-47  

 

 Monocyte %  5.9 %  N  0-7  

 

 Eosinophil %  2.7 %  N  0-6  

 

 Basophil %  0.4 %  N  0-2  

 

 Nucleated Red Blood Cells %  0.3      









 Urine Micro Inhouse  2017  In House  Ua WBC  7      5









 Ua RBC  -      

 

 Ua Casts  -      

 

 Ua Epi  TNTC      

 

 Ua Other  calcium crystals      

 

 Ua Glucose  -      

 

 Ua Bilirubin  3+      

 

 Ua Ketones  -      

 

 Ua Specific Gravity  1.030      

 

 Ua Blood  -      

 

 Ua PH  5.0      

 

 Ua Protein  -      

 

 Ua Urobilinogen  -      

 

 Ua Nitrite  -      

 

 Ua Leukocytes  tr      









 Laboratory test  2017  [facility]  Point of Care  119 mg/dL  High  74
-106  6



 finding    [phone]  Glucose        

 

 CBC Auto Diff  2017  [facility]  White Blood  10.7 10^3/uL  N  3.5-
10.8  



     [phone]  Count        









 Red Blood Count  4.79 10^6/uL  N  4.0-5.4  

 

 Hemoglobin  14.2 g/dL  N  12.0-16.0  

 

 Hematocrit  43 %  N  35-47  

 

 Mean Corpuscular Volume  90 fL  N  80-97  

 

 Mean Corpuscular Hemoglobin  30 pg  N  27-31  

 

 Mean Corpuscular HGB Conc  33 g/dL  N  31-36  

 

 Red Cell Distribution Width  15 %  N  10.5-15  

 

 Platelet Count  255 10^3/uL  N  150-450  

 

 Mean Platelet Volume  10 um3  N  7.4-10.4  

 

 Abs Neutrophils  8.5 10^3/uL  High  1.5-7.7  

 

 Abs Lymphocytes  1.6 10^3/uL  N  1.0-4.8  

 

 Abs Monocytes  0.4 10^3/uL  N  0-0.8  

 

 Abs Eosinophils  0.1 10^3/uL  N  0-0.6  

 

 Abs Basophils  0 10^3/uL  N  0-0.2  

 

 Abs Nucleated RBC  0.01 10^3/uL  N    

 

 Granulocyte %  79.7 %  N  38-83  

 

 Lymphocyte %  15.0 %  Low  25-47  

 

 Monocyte %  4.0 %  N  1-9  

 

 Eosinophil %  1.0 %  N  0-6  

 

 Basophil %  0.3 %  N  0-2  

 

 Nucleated Red Blood Cells %  0.1  N    









 Comp Metabolic Panel  2017  [facility]  Sodium  138 mmol/L  N  133-
145  



     [phone]          









 Potassium  4.0 mmol/L  N  3.5-5.0  

 

 Chloride  102 mmol/L  N  101-111  

 

 Co2 Carbon Dioxide  29 mmol/L  N  22-32  

 

 Anion Gap  7 mmol/L  N  2-11  

 

 Glucose  86 mg/dL  N    

 

 Blood Urea Nitrogen  11 mg/dL  N  6-24  

 

 Creatinine  0.91 mg/dL  N  0.51-0.95  

 

 BUN/Creatinine Ratio  12.1  N  8-20  

 

 Calcium  9.3 mg/dL  N  8.6-10.3  

 

 Total Protein  7.8 g/dL  N  6.4-8.9  

 

 Albumin  4.4 g/dL  N  3.2-5.2  

 

 Globulin  3.4 g/dL  N  2-4  

 

 Albumin/Globulin Ratio  1.3  N  1-3  

 

 Total Bilirubin  0.40 mg/dL  N  0.2-1.0  

 

 Alkaline Phosphatase  82 U/L  N    

 

 Alt  17 U/L  N  7-52  

 

 Ast  23 U/L  N  13-39  

 

 Egfr Non-  65.4  N  >60  

 

 Egfr   84.2  N  >60  7









 Laboratory test finding  2017  [facility]  Magnesium  2.0 mg/dL  N  
1.9-2.7  



     [phone]          









 Troponin-I (TnI)  0.01 ng/mL  N  <0.04  

 

 TSH (Thyroid Stim Horm)  6.50 mcIU/mL  High  0.34-5.60  

 

 Lactic Acid  1.3 mmol/L  N  0.5-2.0  8









 Laboratory test  2017  [facility]  Urine Culture And  SEE RESULT    
  9



 finding    [phone]  Sensitivities  BELOW      

 

 Urinalysis Profile  2017  [facility]  Urine Color  Yellow  N    



     [phone]          









 Urine Appearance  Cloudy  N    

 

 Urine Specific Gravity  1.009  Low  1.010-1.030  

 

 Urine pH  7.0  N  5-9  

 

 Urine Urobilinogen  Negative  N  Negative  

 

 Urine Ketones  Negative  N  Negative  

 

 Urine Protein  Negative  N  Negative  

 

 Urine Leukocytes  1+  Abnormal  Negative  

 

 Urine Blood  Negative  N  Negative  

 

 *  *  Abnormal  Negative  10

 

 Urine Nitrite  Negative  N  Negative  

 

 Urine Bilirubin  Negative  N  Negative  

 

 Urine Glucose  Negative  N  Negative  









 Laboratory test  2016  [facility]  TSH (Thyroid  4.36 mcIU/mL  N  
0.34-5.60  



 finding    [phone]  Stim Horm)        









 T3 Free  2.60 pg/mL  N  2.5-3.9  

 

 Free T4 (Free Thyroxine)  0.80 ng/dL  N  0.61-1.12  









 CBC Auto Diff  2016  [facility]  White Blood Count  8.3 10^3/uL  N  
3.5-10.8  



     [phone]          









 Red Blood Count  5.16 10^6/uL  N  4.0-5.4  

 

 Hemoglobin  14.9 g/dL  N  12.0-16.0  

 

 Hematocrit  45 %  N  35-47  

 

 Mean Corpuscular Volume  87 fL  N  80-97  

 

 Mean Corpuscular Hemoglobin  29 pg  N  27-31  

 

 Mean Corpuscular HGB Conc  33 g/dL  N  31-36  

 

 Red Cell Distribution Width  14 %  N  10.5-15  

 

 Platelet Count  269 10^3/uL  N  150-450  

 

 Mean Platelet Volume  10 um3  N  7.4-10.4  

 

 Abs Neutrophils  6.4 10^3/uL  N  1.5-7.7  

 

 Abs Lymphocytes  1.3 10^3/uL  N  1.0-4.8  

 

 Abs Monocytes  0.5 10^3/uL  N  0-0.8  

 

 Abs Eosinophils  0.1 10^3/uL  N  0-0.6  

 

 Abs Basophils  0 10^3/uL  N  0-0.2  

 

 Abs Nucleated RBC  0 10^3/uL  N    

 

 Granulocyte %  77.3 %  N  38-83  

 

 Lymphocyte %  15.7 %  Low  25-47  

 

 Monocyte %  5.5 %  N  1-9  

 

 Eosinophil %  1.1 %  N  0-6  

 

 Basophil %  0.4 %  N  0-2  

 

 Nucleated Red Blood Cells %  0  N    









 Comp Metabolic Panel  2016  [facility]  Sodium  137 mmol/L  N  133-
145  



     [phone]          









 Potassium  4.4 mmol/L  N  3.5-5.0  

 

 Chloride  102 mmol/L  N  101-111  

 

 Co2 Carbon Dioxide  30 mmol/L  N  22-32  

 

 Anion Gap  5 mmol/L  N  2-11  

 

 Glucose  77 mg/dL  N    

 

 Blood Urea Nitrogen  11 mg/dL  N  6-24  

 

 Creatinine  0.90 mg/dL  N  0.51-0.95  

 

 BUN/Creatinine Ratio  12.2  N  8-20  

 

 Calcium  9.4 mg/dL  N  8.6-10.3  

 

 Total Protein  7.3 g/dL  N  6.4-8.9  

 

 Albumin  4.2 g/dL  N  3.2-5.2  

 

 Globulin  3.1 g/dL  N  2-4  

 

 Albumin/Globulin Ratio  1.4  N  1-3  

 

 Total Bilirubin  0.50 mg/dL  N  0.2-1.0  

 

 Alkaline Phosphatase  82 U/L  N    

 

 Alt  12 U/L  N  7-52  

 

 Ast  14 U/L  N  13-39  

 

 Egfr Non-  66.5  N  >60  

 

 Egfr   85.6  N  >60  11









 Celiac Panel  2016  [facility]  Tissue Transglutaminase IgA  <1.2 U/
mL  N    12



     [phone]  Ab        









 Immunoglobulin A  287 mg/dL  N  61 - 356  

 

 Celiac Interpretation  See Comment  N    13









 Celiac Hla DQ1/DQ2  2016  [facility]  Hla-Dqa1  SEE BELOW  N    14



     [phone]          









 Hla-DQB1  SEE BELOW  N    15

 

 Celiac Gene Pairs Present?  Yes  N    

 

 Celiac Gene Interpretation  See Comment  N    16









 Laboratory test  2016  [facility]  C Reactive  12.67 mg/L  High  < 
5.00  17



 finding    [phone]  Protein        









 Erythrocyte Sed Rate  24 mm/Hr  High  0-14  

 

 Vitamin D Total 25(Oh)  46.1 ng/mL  N  30-50  

 

 Vitamin B12  254 pg/mL  N  180-914  18









 Connective Tissue Panel  2016  [facility]  Anti-Nuclear Antibody  
0.3 U  N    19



     [phone]          









 Cyclic Citrullinated Peptide  18.9 U  N    20

 

 Interpretation  See Comment  N    21









 Laboratory  09/15/2016  [facility]  TSH (Thyroid  5.51  N  0.34-5.60  



 test finding    [phone]  Stim Horm)  mcIU/mL      

 

 Laboratory  06/15/2016  [facility]  TSH (Thyroid  6.59  High  0.34-5.60  



 test finding    [phone]  Stim Horm)  ?IU/mL      

 

 Xray  2015  [facility]  X-Ray, Wrist,  normal      



       Complete, Min.  wrist      



       Of 3 Views R        

 

 Laboratory  2015  [facility]  TSH (Thyroid  3.70 IU/mL  N  0.34-5.60
  



 test finding    [phone]  Stimulating        



       Horm)        









 Vitamin D Total 25(Oh)  22.8 ng/mL  Low  30-50  









 Urine Micro Inhouse  2015  In House  Ua WBC  0-2      









 Ua RBC  0-1      

 

 Ua Casts  -      

 

 Ua Epi  0-2      

 

 Ua Other  sediment      

 

 Ua Glucose  -      

 

 Ua Bilirubin  -      

 

 Ua Ketones  -      

 

 Ua Specific Gravity  1.005      

 

 Ua Blood  -      

 

 Ua PH  7.5      

 

 Ua Protein  -      

 

 Ua Urobilinogen  -      

 

 Ua Nitrite  -      

 

 Ua Leukocytes  2+      









 Urine Culture And  2015  [facility]  Urine Culture  (SEE NOTE)      
22



 Sensitivities    [phone]          

 

 Laboratory test  2014  [facility]  Erythrocyte Sed  21 mm/Hr  High  0
-14  



 finding    [phone]  Rate        









 Creatine Kinase  38 U/L      

 

 Angiotension Converting Enzyme  26 U/L    8 - 53  23

 

 Hepatitis C Antibody  Nonreactive    Nonreactive  

 

 Collins (Anti-Nuclear AB) Screen  Negative    Negative  

 

 Cyclic Citrullinated Pept IgG  <15.6 U      24

 

 Rheumatoid Factor  <15 IU/mL    <15  25

 

 Uric Acid  5.3 mg/dL    2.3-6.6  









 Vitamin D, 25  2014  [facility]  25-Hydroxy Vitamin D2  <4.0 ng/mL  
    



 Hydroxy    [phone]          









 25-Hydroxy Vitamin D3  26 ng/mL      

 

 25-Hydroxy Vitamin D Total  26 ng/mL      26









 Ehrlichia Igg &  2014  [facility]  Anaplasma  <1:64 titer    <1:64  
27



 Igm Abs    [phone]  phagocytophila IgG        









 Ehrlichia chaffeensis IgG AB  <1:64 titer    <1:64  28









 Laboratory test  2014  [facility]  Babesiosis  <1:64 titer    <1:64  
29



 finding    [phone]  Evaluation        

 

 Lyme Western Blot  2014  [facility]  Lyme Disease IgG  Negative    
Negative  



     [phone]  Ab WB        









 Lyme Disease IgG Bands Present  No bands detecte <SEE NOTE> kDa      30

 

 Lyme Disease IgM Ab WB  Negative    Negative  

 

 Lyme Disease IgM Bands Present  No bands detecte <SEE NOTE> kDa      31

 

 Lyme Disease Interpretation  See Comment      32









 Xray  2014  [facility]  CT, Brain (Or Head) Without  wnl    
  



     [address]  Contrast        



     [place] [address]          



     [phone]          









 CT, Sinus without Contrast  past sinus surgy      









 Laboratory test  2014  [facility]  TSH (Thyroid  3.53 miu/mL    0.34-
5.60  



 finding    [phone]  Stimulating Horm)        

 

 Comp Metabolic  2014  [facility]  Sodium  137 mmol/L    133-145  



 Panel    [phone]          









 Potassium  4.7 mmol/L    3.5-5.0  

 

 Chloride  101 mmol/L    101-111  

 

 Co2 Carbon Dioxide  33.0 mmol/L  High  22-32  

 

 Anion Gap  3.0 mmol/L    2-11  

 

 Glucose  88 mg/dL      

 

 Blood Urea Nitrogen  8 mg/dL    6-24  

 

 Creatinine  0.70 mg/dL    0.50-1.40  

 

 BUN/Creatinine Ratio  11.4    8-20  

 

 Calcium  9.1 mg/dL    8.1-9.9  

 

 Total Protein  6.6 g/dL    6.2-8.1  

 

 Albumin  3.9 g/dL    3.6-5.4  

 

 Globulin  2.7 g/dL    2-4  

 

 Albumin/Globulin Ratio  1.4    1-3  

 

 Total Bilirubin  0.5 mg/dL    0.4-1.5  

 

 Alkaline Phosphatase  70 U/L      

 

 Alt  21 U/L    14-54  

 

 Ast  23 U/L    12-42  

 

 Egfr Non-  90.1    >60  

 

 Egfr   115.9    >60  33









 CBC Auto Diff  2014  [facility]  White Blood Count  9.4 10^3/uL    
4.8-10.8  



     [phone]          









 Red Blood Count  4.74 10^6/uL    4.0-5.4  

 

 Hemoglobin  13.8 g/dL    12.0-16.0  

 

 Hematocrit  41 %    35-47  

 

 Mean Corpuscular Volume  86 fL    80-97  

 

 Mean Corpuscular Hemoglobin  29 pg    27-31  

 

 Mean Corpuscular HGB Conc  34 g/dL    31-36  

 

 Red Cell Distribution Width  14 %    10.5-15  

 

 Platelet Count  279 10^3/uL    150-450  

 

 Mean Platelet Volume  10 um3    7.4-10.4  

 

 Abs Neutrophils  6.4 10^3/uL    1.5-7.7  

 

 Abs Lymphocytes  2.2 10^3/uL    1.0-4.8  

 

 Abs Monocytes  0.6 10^3/uL    0-0.8  

 

 Abs Eosinophils  0.2 10^3/uL    0-0.6  

 

 Abs Basophils  0 10^3/uL    0-0.2  

 

 Abs Nucleated RBC  0.01 10^3/uL      

 

 Granulocyte %  68.4 %    38-83  

 

 Lymphocyte %  23.1 %  Low  25-47  

 

 Monocyte %  6.3 %    1-9  

 

 Eosinophil %  1.8 %    0-6  

 

 Basophil %  0.4 %    0-2  

 

 Nucleated Red Blood Cells %  0.1      









 Basic Metabolic Panel  2013  [facility]  Sodium  137 mmol/L    133-
145  



     [phone]          









 Potassium  4.3 mmol/L    3.5-5.0  

 

 Chloride  101 mmol/L    101-111  

 

 Co2 Carbon Dioxide  29.0 mmol/L    22-32  

 

 Anion Gap  7.0 mmol/L    2-11  

 

 Glucose  106 mg/dL  High    

 

 Blood Urea Nitrogen  11 mg/dL    6-24  

 

 Creatinine  0.80 mg/dL    0.50-1.40  

 

 BUN/Creatinine Ratio  13.8    8-20  

 

 Calcium  8.9 mg/dL    8.1-9.9  

 

 Egfr Non-  77.2    >60  

 

 Egfr   99.3    >60  34









 Ua Inhouse  2013  In House  Ua Glucose  -      









 Ua Bilirubin  -      

 

 Ua Ketones  -      

 

 Ua Specific Gravity  1.005      

 

 Ua Blood  -      

 

 Ua PH  6.5      

 

 Ua Protein  -      

 

 Ua Urobilinogen  -      

 

 Ua Nitrite  -      

 

 Ua Leukocytes  -      









 Clotest  2013  [facility]  Clotest  NEGATIVE      35



     [phone]          

 

 Surgical  2013  [facility]  S  RUN DATE:      36



 Pathology    [phone]    01/15/ <SEE      



         NOTE>      

 

 Syphilis  2012  [facility]  Syphilis IgG  Nonreactive    Nonreactive
  37



 Screen    [phone]          









 RPR  TNP    Nonreactive  

 

 RPR Titer  TNP      

 

 Pediatric/Maternal  NO      









 Collins (Antinuclear  2008  [facility]  Antinuclear AB  NEGATIVE    
Negative  38



 Antibodies)    [phone]          

 

 Laboratory test  2008  [facility]  Rheumatoid  < 20.0 IU/mL    Less 
Than  



 finding    [phone]  Factor      20  









 Thyroxine  7.4 g/dL    5-12  

 

 TSH  3.43 MIU/ML    0.34-5.60  

 

 Erythrocyte Sed Rate  12 MM/HR    0-15  

 

 Complement C1Q  25 mg/dL  Abnormal  12-22  39









 1  *******Because ethnic data is not always readily available,



   this report includes an eGFR for both -Americans and



   non- Americans.****



   The National Kidney Disease Education Program (NKDEP) does



   not endorse the use of the MDRD equation for patients that



   are not between the ages of 18 and 70, are pregnant, have



   extremes of body size, muscle mass, or nutritional status,



   or are non- or non-.



   According to the National Kidney Foundation, irrespective of



   diagnosis, the stage of the disease is based on the level of



   kidney function:



   Stage Description                      GFR(mL/min/1.73 m(2))



   1     Kidney damage with normal or decreased GFR       90



   2     Kidney damage with mild decrease in GFR          60-89



   3     Moderate decrease in GFR                         30-59



   4     Severe decrease in GFR                           15-29



   5     Kidney failure                       <15 (or dialysis)

 

 2  Normal Range 180 to 914



   Indeterminate Range 145 to 180



   Deficient Range  <145

 

 3  SEE RESULT BELOW



   -----------------------------------------------------------------------------
---------------



   Name:  SHANNAN AQUINO                  : 1965    Attend Dr: Karyn COELHO



   Acct:  [id number]  Unit: [id number]  AGE: 53            Location:  [facility]



   Reg:   10/16/18                        SEX: F             Status:    REG REF



   -----------------------------------------------------------------------------
---------------



   



   SPEC: [id number]            MIKAYLA: 10/16/      SUBM DR: Karyn COELHO



   REQ:  [id number]             RECD: 10/16/



   STATUS: SOUT



   _



   ORDERED:  LEVEL 4



   COMMENTS: [id number]



   



   FINAL DIAGNOSIS



   



   



   Uterus, endocervix, biopsy:



   -- Benign endocervical polyp.



   



   



   -----------------------------------------------------------------------------
---------------



   



   



   PRE-OPERATIVE DIAGNOSIS



   



   Benign neoplasm of vagina



   



   GROSS DESCRIPTION



   



   The specimen is received in formalin labeled, Cervical Polyp, and consists 
of a 1.1 x 1.0 by



   up to 0.6 cm tan-red rubbery polypoid soft tissue fragment admixed with 
scant mucus.  The



   specimen is inked, serially sectioned and entirely submitted in one cassette.



   



   Signed by and Reported on: __________              Melanie Rodriguez MD 
10/17/18 1108



   



   -----------------------------------------------------------------------------
---------------



   



   



   



   



   



   



   



   



   



   



   



   



   



   ** END OF REPORT **



   



   DEPARTMENT OF PATHOLOGY,  [address] [place] [address]



   Phone # [phone]      Fax #[phone]



   Cruz Vasquez M.D. Director     [facility] # [id number]

 

 4  SEE RESULT BELOW



   -----------------------------------------------------------------------------
---------------



   Name:  DESTINYSHANNANCORBY                  : 1965    Attend Dr: Karyn COELHO



   Acct:  [id number]  Unit: [id number]  AGE: 53            Location:  [facility]



   Reg:   10/05/18                        SEX: F             Status:    REG REF



   -----------------------------------------------------------------------------
---------------



   



   SPEC: [id number]            MIKAYLA: 10/05/      [facility] DR: Karyn COELHO



   REQ:  [id number]             RECD: 10/05/



   STATUS: SOUT



   _



   ORDERED:  TP IMAGE ANALYS, HPV/Thin Prep



   COMMENTS: [id number]



   Negative for Intraepithelial lesion or Malignancy



   



   -----------------------------------------------------------------------------
---------------



   Date     Time Test              Result   Flag (u) Normal Range



   10/05/18 1410 @ HPV RNA         Negative          Negative



   @



   @               The high-risk HPV types detected by the assay include: 16,



   @               18, 31, 33, 35, 39, 45, 51, 52, 56, 58, 59, 66, and 68.



   -----------------------------------------------------------------------------
---------------



   



   A. Ectocervical/Endocervical



   Specimen Adequacy:



   Satisfactory of evaluation



   Transformation zone component cannot be definitely identified due to



   presence of atrophy or other hormonal changes



   Patient Information:



   HPV: High risk HPV RNA testing regardless of pap results.



   Actual Specimen Date:         10/05/18



   LMP If Unknown:               12+ yrs ago



   Pregnant?:     N



   Post Menopausal?:             Y



   Hysterectomy?:                N



   



   Signed by and Reported on: __________              REMI Randall(ASC) 10
/08/18 7789



   



   This Pap test was evaluated with the assistance of the [employer] Test Imaging 
System. Due to



   cytologic findings at the imager microscope, comprehensive manual 
rescreening by a



   Cytotechnologist may be required.



   The Pap Smear is a screening test designed to aid in the detection of 
premalignant and



   malignant conditions of the uterine cervix.  It is not a diagnostic 
procedure and should



   not be used as the sole means of detecting cervical cancer.  Both false-
positive and false-



   negative reports do occur.  Depending on your risk status, a Pap smear 
should be obtained



   and evaluated every 1-3 years.



   



   -----------------------------------------------------------------------------
---------------



   



   



   ** END OF REPORT **



   



   DEPARTMENT OF PATHOLOGY,  [address] [place] [address]



   Phone # [phone]      Fax #[phone]



   Cruz Vasquez M.D. Director     [facility] # [id number]

 

 5  void, clear, yellow

 

 6  : [id number]

 

 7  *******Because ethnic data is not always readily available,



   this report includes an eGFR for both -Americans and



   non- Americans.****



   The National Kidney Disease Education Program (NKDEP) does



   not endorse the use of the MDRD equation for patients that



   are not between the ages of 18 and 70, are pregnant, have



   extremes of body size, muscle mass, or nutritional status,



   or are non- or non-.



   According to the National Kidney Foundation, irrespective of



   diagnosis, the stage of the disease is based on the level of



   kidney function:



   Stage Description                      GFR(mL/min/1.73 m(2))



   1     Kidney damage with normal or decreased GFR       90



   2     Kidney damage with mild decrease in GFR          60-89



   3     Moderate decrease in GFR                         30-59



   4     Severe decrease in GFR                           15-29



   5     Kidney failure                       <15 (or dialysis)

 

 8  [facility] Severe Sepsis and Septic Shock Management Bundle Measure



   requires all lactic acids initially measuring >2.0 mmol/L be



   repeated.

 

 9  SEE RESULT BELOW



   -----------------------------------------------------------------------------
---------------



   Name:  SHANNAN AQUINO                  : 1965    Attend Dr: Dewayne Mantilla MD



   Acct:  [id number]  Unit: [id number]  AGE: 52            [facility]  [facility]



   Re17                        SEX: F             Status:    DEP ER



   -----------------------------------------------------------------------------
---------------



   



   SPEC: [id number]         MIKAYLA:       [facility] DR: Dewayne Mantilla MD



   REQ:  [id number]              RECD:   



   STATUS: COMP             OTHR DR: [facility]



   Douglas Copeland MD



   _



   SOURCE: URINE          SPDESC:



   ORDERED:  Urine Culture



   



   -----------------------------------------------------------------------------
---------------



   Procedure                         Result                         Reported   
        Site



   -----------------------------------------------------------------------------
---------------



   Urine Culture  Final                                             17-
1040      ML



   



   No growth of clinically significant organisms



   



   -----------------------------------------------------------------------------
---------------



   * ML - MAIN LAB ([facility])



   .



   



   



   



   



   



   



   



   



   



   



   



   



   



   



   



   



   



   



   



   



   



   



   



   



   



   ** END OF REPORT **



   



   * ML=Testing performed at Main Lab



   DEPARTMENT OF PATHOLOGY,  [address] [place] [address]



   Phone # [phone]      Fax #[phone]



   Cruz Vasquez M.D. Director     [facility] # [id number]

 

 10  *Ascorbic acid is present which may interfere with detection



   of blood.

 

 11  *******Because ethnic data is not always readily available,



   this report includes an eGFR for both -Americans and



   non- Americans.****



   The National Kidney Disease Education Program (NKDEP) does



   not endorse the use of the MDRD equation for patients that



   are not between the ages of 18 and 70, are pregnant, have



   extremes of body size, muscle mass, or nutritional status,



   or are non- or non-.



   According to the National Kidney Foundation, irrespective of



   diagnosis, the stage of the disease is based on the level of



   kidney function:



   Stage Description                      GFR(mL/min/1.73 m(2))



   1     Kidney damage with normal or decreased GFR       90



   2     Kidney damage with mild decrease in GFR          60-89



   3     Moderate decrease in GFR                         30-59



   4     Severe decrease in GFR                           15-29



   5     Kidney failure                       <15 (or dialysis)

 

 12  -------------------REFERENCE VALUE--------------------------



   <4.0 (Negative)



   Test Performed by:



   [place] [address]



   : Tito Salmeron II, M.D., Ph.D.

 

 13  Negative serology. Celiac disease unlikely. However,



   approximately 10% of patients with celiac disease are



   seronegative. Also, patients who are already adhering to a



   gluten-free diet may be seronegative. If celiac disease is



   highly clinically suspected, consider HLA-DQ typing.



   Test Performed by:



   [place] [id number]



   : Tito Salmeron II, M.D., Ph.D.

 

 14  RESULT: 01,03



   -------------------REFERENCE VALUE--------------------------



   Not Applicable

 

 15  RESULT: 03:02,05:03



   DQ Serologic Equivalent:



   8,5



   -------------------REFERENCE VALUE--------------------------



   Not Applicable

 

 16  These genes are permissive for celiac disease.  The absence



   of HLA celiac permissive genes would make the presence of



   celiac disease unlikely.  However, these genes can also be



   present in the normal population.



   -------------------ADDITIONAL INFORMATION-------------------



   Method: Molecular typing of HLA antigens performed using



   reverse SSOP and/or SSP methods, reported as serological



   equivalents and low to medium resolution molecular values.



   Performing Laboratory CLIA# [id number]



   Test Performed by:



   [place] [address]



   : Tito Salmeron II, M.D., Ph.D.

 

 17  Acute inflammation:  >10.00

 

 18  Normal Range 180 to 914



   Indeterminate Range 145 to 180



   Deficient Range  <145

 

 19  -------------------REFERENCE VALUE--------------------------



   <=1.0 (Negative)

 

 20  -------------------REFERENCE VALUE--------------------------



   <20.0 (Negative)

 

 21  Tests for antibodies to dsDNA and KING antigens are not



   performed automatically unless the COLLINS result is > or=



   3.0 U.  Studies performed at [facility] indicate that



   positive COLLINS results <3.0 U are rarely accompanied by



   positive second order tests.



   Test Performed by:



   [facility] [place] [id number]



   : Tito Salmeron II, M.D., Ph.D.

 

 22  RUN DATE: 01/31/15               [facility] **LIVE**       
         PAGE    1



   RUN TIME: 6529             [address] [place] [address]   [address]



   Specimen Inquiry



   



   -----------------------------------------------------------------------------
---------------



   Name:  SHANNAN AQUINO                  : 1965    Attend Dr: 
Ana Maria EPPS



   Acct:  [id number]  Unit: [id number]  AGE: 50            Location:  [facility]



   Re/29/15                        SEX: F             Status:    DEP ER



   -----------------------------------------------------------------------------
---------------



   



   SPEC: [id number]         MIKAYLA:   01/29/    SUBM DR: Ana Maria Wright DO



   REQ:  [id number]              RECD:   01/29/



   STATUS: COMP             [facility] DR: Douglas Copeland MD



   _



   SOURCE: URINE          SPDESC:



   ORDERED:  Urine Culture



   



   -----------------------------------------------------------------------------
---------------



   Procedure                         Result                         Verified   
        Site



   -----------------------------------------------------------------------------
---------------



   Urine Culture  Final                                             01/31/15-
1234      ML



   



   Organism 1                     NORMAL SHANTEL



   Ethel Count                1-10,000 (Few) CFU/ML



   



   -----------------------------------------------------------------------------
---------------



   



   



   



   



   



   



   



   



   



   



   



   



   



   



   



   



   



   



   



   



   



   



   



   



   



   



   



   ** END OF REPORT **



   



   * ML=Testing performed at Main Lab



   DEPARTMENT OF PATHOLOGY,  [address] [place] [address]



   Phone # [phone]      Fax #[phone]



   Cruz Vasquez M.D. Director     [facility] # [id number]

 

 23  Test Performed by:



   [address] [address]



   : Levi Chatman III, M.D.

 

 24  -- REFERENCE VALUE --



   <20.0 (Negative)



   Test Performed by:



   [address] [address]



   : Levi Chatman III, M.D.

 

 25  Test Performed by:



   [address] [address]



   : Levi Chatman III, M.D.

 

 26  -- REFERENCE VALUE --



   25-HYDROXY D TOTAL (D2+D3) Optimum levels in the healthy



   population are 20-50, patients with bone disease may



   benefit from higher levels within this range.



   Test Performed by:



   [place] [address]



   : Levi Chatman III, M.D.

 

 27  Analyte Specific Reagent: This test was developed and its



   performance characteristics determined by [facility]. It



   has not been cleared or approved by the U.S. Food and Drug



   Administration.

 

 28  Analyte Specific Reagent: This test was developed and its



   performance characteristics determined by [facility]. It



   has not been cleared or approved by the U.S. Food and Drug



   Administration.



   Test Performed by:



   [place] [address]



   : Levi Chatman III, M.D.

 

 29  Analyte Specific Reagent: This test was developed and its



   performance characteristics determined by [facility]. It



   has not been cleared or approved by the U.S. Food and Drug



   Administration.



   Test Performed by:



   [place] [address]



   : Levi Chatman III, M.D.

 

 30  No bands detected

 

 31  No bands detected

 

 32  Specific serologic response to B. burgdorferi infection is



   not detected, but cannot rule out early infection during



   which low or undetectable antibody levels to B. burgdorferi



   may be present.  If clinically indicated, a new serum



   specimen should be submitted in 7-14 days.



   CDC criteria require >=5 bands for IgG or >=2 bands for IgM



   for the Immunoblot to be considered positive. Bands



   (e.g.,p41) may be detected in patients without Lyme



   disease, and patterns not meeting the CDC criteria should



   be interpreted with caution.



   Immunoblot should be ordered only on specimens that are



   positive or equivocal by a FDA-licensed Lyme disease



   antibody screening test (e.g., EIA).



   Test Performed by:



   [place] [address]



   : Levi Chatman III, M.D.

 

 33  *******Because ethnic data is not always readily available,



   this report includes an eGFR for both -Americans and



   non- Americans.****



   The National Kidney Disease Education Program (NKDEP) does



   not endorse the use of the MDRD equation for patients that



   are not between the ages of 18 and 70, are pregnant, have



   extremes of body size, muscle mass, or nutritional status,



   or are non- or non-.



   According to the National Kidney Foundation, irrespective of



   diagnosis, the stage of the disease is based on the level of



   kidney function:



   Stage Description                      GFR(mL/min/1.73 m(2))



   1     Kidney damage with normal or decreased GFR       90



   2     Kidney damage with mild decrease in GFR          60-89



   3     Moderate decrease in GFR                         30-59



   4     Severe decrease in GFR                           15-29



   5     Kidney failure                       <15 (or dialysis)

 

 34  *******Because ethnic data is not always readily available,



   this report includes an eGFR for both -Americans and



   non- Americans.****



   The National Kidney Disease Education Program (NKDEP) does



   not endorse the use of the MDRD equation for patients that



   are not between the ages of 18 and 70, are pregnant, have



   extremes of body size, muscle mass, or nutritional status,



   or are non- or non-.



   According to the National Kidney Foundation, irrespective of



   diagnosis, the stage of the disease is based on the level of



   kidney function:



   Stage Description                      GFR(mL/min/1.73 m(2))



   1     Kidney damage with normal or decreased GFR       90



   2     Kidney damage with mild decrease in GFR          60-89



   3     Moderate decrease in GFR                         30-59



   4     Severe decrease in GFR                           15-29



   5     Kidney failure                       <15 (or dialysis)

 

 35  RUN DATE: 01/15/13               [facility] **LIVE**       
         PAGE    1



   RUN TIME: 816             [address] [place] [address]   [address]



   Specimen Inquiry



   



   -----------------------------------------------------------------------------
---------------



   Name:  SHANNAN AQUINO                      : 1965    Attend Dr: Roger Phelps MD



   Acct:  [id number]  Unit: [id number]  AGE: 47            Location:  [facility]



   Re13                        SEX: F             Status:    REG REF



   -----------------------------------------------------------------------------
---------------



   



   SPEC: [id number]         MIKAYLA:       [facility] DR: Lenin MIRANDA,
Roger



   REQ:  [id number]              RECD:   



   STATUS: MOR[facility] VOGT DR: Dinorah MIRANDA,Douglas



   Sana



   SOURCE: GAS ANTRUM     SPDESC:



   ORDERED:  Clotest



   



   -----------------------------------------------------------------------------
---------------



   Procedure                         Result                         Verified   
        Site



   -----------------------------------------------------------------------------
---------------



   Clotest  Final                                                   01/15/13-
0815      ML



   Clotest                     Negative



   



   -----------------------------------------------------------------------------
---------------



   



   



   



   



   



   



   



   



   



   



   



   



   



   



   



   



   



   



   



   



   



   



   



   



   



   



   



   



   



   ** END OF REPORT **



   



   * ML=Testing performed at Main Lab



   DEPARTMENT OF PATHOLOGY,  [address] [place], [place] [address]



   Phone # [phone]      Fax #[phone]



   Cruz Vasquez M.D. Director     [place] [employer]  #[id number]

 

 36  RUN DATE: 01/15/13               [facility] **LIVE**       
         PAGE    1



   RUN TIME: 163             [address], [place]   [address]



   Specimen Inquiry



   



   -----------------------------------------------------------------------------
---------------



   Name:  SHANNAN AQUINO                      : 1965    Attend Dr: Roger Phelps MD



   Acct:  [id number]  Unit: [id number]  AGE: 47            Location:  [facility]



   Re13                        SEX: F             Status:    REG REF



   -----------------------------------------------------------------------------
---------------



   



   SPEC:               MIKAYLA: 13-          SUBM DR: Roger Phelps MD



   REQ:  [id number]             RECD: [id number]



   STATUS: [place] VOGT DR: Dinorah MIRANDAFolsom



   _



   ORDERED:  LEVEL IV



   



   FINAL DIAGNOSIS



   



   



   Stomach, cardia, biopsy:



   Hyperplastic fundic gland polyp.



   



   



   



   



   CLINICAL HISTORY



   



   Gastroesophageal reflux disease; high dysphagia, pressure pain after meals 
or bending or



   acid coming up



   



   GROSS DESCRIPTION



   



   The specimen is received in formalin labelled hSannan Aquino, Gastric Cardia 
Polyp, and consists



   of a tan, soft tissue fragment measuring 0.7 x 0.5 x 0.3 cm. Submitted 
entirely, one



   cassette.



   



   Signed __________(signature on file)___________ Cruz Vasquez MD 01/15/
13 1638



   



   -----------------------------------------------------------------------------
---------------



   



   



   



   



   



   



   



   



   



   



   



   



   



   



   ** END OF REPORT **



   



   * ML=Testing performed at Main Lab



   DEPARTMENT OF PATHOLOGY,  [address] [place] [address]



   Phone # [phone]      Fax #[phone]



   Cruz Vasquez M.D. Director     [place] [employer] Perm[employer]  #[id number]

 

 37  Warning:  A positive result is not useful for establishing a



   diagnosis of syphilis.  In most situations, such a result



   may reflect a prior treated infection; a negative result can



   exclude a diagnosis of syphilis except for incubating or



   early primary disease.

 

 38  no longer our pt,transferred in 

 

 39  Analyte Specific Reagent



   This test was developed and its performance characteristics



   determined by [facility] Pathology, [facility]. This test has not been cleared or



   approved by the U.S. Food and Drug Administration.



   



   Test Performed by:



   [facility]



   [address] [place] [address]



   : Levi Chatman III, M.D.







Procedures







 Date  Code  Description  Status

 

 2018  34204  Omt 3 To 4 Body Regions Involved  Completed

 

 10/16/2018  00850  Biopsy Cervix  Completed

 

 06/15/2016  51870  Pure Tone, Threshold  Completed

 

 2016  29407  Remove Impact Cerumen Irrigation/Lavage Unilateral  
Completed

 

 2015  79404  X-Ray Wrist Three Views  Completed

 

 09/10/2014  12910  Remove Impact Cerumen Requiring Instrument, Unilateral  
Completed

 

 2014  06626  Inj single or mult trigger points  Completed

 

 2012  83266  X-Ray Knee, Complete  Completed

 

 08/10/2012  68743  Electrocardiogram Complete  Completed

 

 2011  11696336  Colonoscopy  Completed







Encounters







 Type  Date  Location  Provider  Dx  Diagnosis

 

 Office Visit  2018 11:45a  Main Office  Yasmani Lopez D.O.  R11.0  
Nausea









 K44.9  Diaphragmatic hernia without obstruction or gangrene

 

 R51  Headache

 

 M99.00  Segmental and somatic dysfunction of head region

 

 M99.01  Segmental and somatic dysfunction of cervical region

 

 M99.02  Segmental and somatic dysfunction of thoracic region

 

 M99.08  Segmental and somatic dysfunction of rib cage

 

 H81.392  Other peripheral vertigo, left ear









 Office Visit  10/05/2018 10:20a  Main Office  Karyn aCdena,  D26.0  Other 
benign



       P.A.    neoplasm of cervix



           uteri









 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

 

 Z12.39  Encounter for oth screening for malignant neoplasm of breast

 

 Z12.4  Encounter for screening for malignant neoplasm of cervix

 

 Z23  Encounter for immunization

 

 Z41.8  Encntr for oth proc for purpose oth [facility]

 

 N95.1  Menopausal and female climacteric states

 

 G47.09  Other insomnia









 Office Visit  2018  3:30p  Main Office  Douglas Copeland,  Z12.4  
Encounter for



       M.D.    screening for



           malignant neoplasm



           of cervix









 I10  Essential (primary) hypertension

 

 E06.3  Autoimmune thyroiditis

 

 G43.009  Migraine w/o aura, not intractable, w/o status migrainosus

 

 Z00.01  Encounter for general adult medical exam w abnormal findings

 

 R00.1  Bradycardia, unspecified

 

 Z23  Encounter for immunization

 

 R20.2  Paresthesia of skin

 

 Z71.89  Other specified counseling









 Office Visit  2018 11:00a  Main Office  Karyn Cadena,  R20.2  
Paresthesia of skin



       P.A.    









 D48.5  Neoplasm of uncertain behavior of skin

 

 M67.442  Ganglion, left hand

 

 M25.569  Pain in unspecified knee









 Office Visit  2018 11:00a  Main Office  Karyn Cadena,  R20.2  
Paresthesia of skin



       P.A.    









 M25.521  Pain in right elbow

 

 D48.5  Neoplasm of uncertain behavior of skin

 

 M67.442  Ganglion, left hand

 

 E06.3  Autoimmune thyroiditis

 

 Z80.8  Family history of malignant neoplasm of organs or systems









 Office Visit  2018 10:40a  Main Office  Karyn Cadena  G47.00  Insomnia,



       P.A.    unspecified









 Z72.820  Sleep deprivation

 

 Z56.3  Stressful work schedule









 Office Visit  2017  3:20p  Main Office  Karyn Cadena,  B02.9  Zoster 
without



       P.A.    complications









 I10  Essential (primary) hypertension

 

 R42  Dizziness and giddiness

 

 T44.7x5A  Adverse effect of beta-adrenoreceptor antagonists, init









 Office Visit  2017  3:40p  Main Office  Karyn Cadena,  B02.9  Zoster 
without



       P.A.    complications

 

 Office Visit  2017  3:40p  Main Office  Karyn Cadena,  B02.9  Zoster 
without



       P.A.    complications

 

 Office Visit  2017  2:45p  Main Office  Dinorah  R07.89  Other chest 
pain



       GABE Cole    









 K21.0  Gastro-esophageal reflux disease with esophagitis

 

 R13.12  Dysphagia, oropharyngeal phase

 

 I10  Essential (primary) hypertension









 Office Visit  2017  3:00p  Main Office  Douglas Copeland,  H81.399  Other 
peripheral



       M.D.    vertigo,



           unspecified ear









 R00.1  Bradycardia, unspecified

 

 E06.3  Autoimmune thyroiditis

 

 R07.89  Other chest pain

 

 I10  Essential (primary) hypertension









 Office Visit  2017  3:30p  Main Office  Douglas Copeland,  Z00.00  Encntr 
for



       M.D.    general adult



           medical exam w/o



           abnormal findings









 Z12.31  Encntr screen mammogram for malignant neoplasm of breast

 

 R53.83  Other fatigue

 

 E06.3  Autoimmune thyroiditis

 

 I10  Essential (primary) hypertension









 Office Visit  2017 12:55p  Main Office  Yasmani Lopez,  E53.8  
Deficiency of other



       D.O.    specified B group



           vitamins









 R42  Dizziness and giddiness

 

 E06.3  Autoimmune thyroiditis

 

 H65.22  Chronic serous otitis media, left ear

 

 M54.31  Sciatica, right side









 Office Visit  2016  4:30p  Main Office  Yasmani Lopez,  R42  Dizziness 
and



       D.O.    giddiness









 E06.3  Autoimmune thyroiditis

 

 H65.22  Chronic serous otitis media, left ear

 

 M54.5  Low back pain

 

 R53.83  Other fatigue









 Office Visit  06/15/2016  1:45p  Main Office  Douglas Copeland,  H65.22  
Chronic serous



       M.D.    otitis media,



           left ear









 E06.3  Autoimmune thyroiditis

 

 H61.22  Impacted cerumen, left ear

 

 H65.02  Acute serous otitis media, left ear









 Office Visit  2016  9:15a  Main Office  Douglas Copeland  H65.22  
Chronic serous



       M.D.    otitis media,



           left ear

 

 Office Visit  2016 11:00a  Main Office  Douglas Copeland,  H61.22  
Impacted cerumen,



       M.D.    left ear









 H65.22  Chronic serous otitis media, left ear

 

 H65.02  Acute serous otitis media, left ear









 Office Visit  2016 11:00a  Main Office  Douglas Copeland,  H65.22  
Chronic serous



       M.D.    otitis media,



           left ear









 M54.5  Low back pain

 

 M79.604  Pain in right leg

 

 M25.551  Pain in right hip

 

 M70.61  Trochanteric bursitis, right hip









 Office Visit  2015  3:00p  Main Office  Yasmani Lopez,  719.43  Pain 
Joint Forearm



       D.O.    

 

 Office Visit  2015  2:00p  Main Office  Douglas Copeland,  245.2  
Thyroiditis Chronic



       M.D.    Lymphocytic









 780.79  Malaise And Fatigue Other

 

 268.9  Vitamin D Deficiency Unspec

 

 V70.0  Examination General Medical Routine AT Health Care Facility

 

 346.10  Migraine Common W/O Intractable W/O Status Migrainosus

 

 401.1  Hypertension Benign

 

 V77.0  Screening Thyroid Disorders

 

 V72.31  Routine Gyn Examination









 Office Visit  09/10/2014 10:45a  Main Office  Sheridan Kimbrough MD  388.9  Ear 
Disorder Unspec









 780.79  Malaise And Fatigue Other

 

 780.4  Dizziness & Giddiness

 

 380.4  Impacted Cerumen









 Office Visit  2014  9:30a  Main Office  Yasmani Lopez,  729.1  Myalgia 
& Myositis



       D.O.    Unspec









 245.2  Thyroiditis Chronic Lymphocytic

 

 780.79  Malaise And Fatigue Other

 

 719.45  Pain Joint Pelvic Region & Thigh









 Office Visit  2014  3:00p  Main Office  Yasmani Lopez,  729.1  Myalgia 
& Myositis



       D.O.    Unspec









 780.79  Malaise And Fatigue Other

 

 719.41  Pain Joint Shoulder Region

 

 268.9  Vitamin D Deficiency Unspec









 Office Visit  2014  4:45p  Main Office  Yasmani Lopez,  729.1  Myalgia 
& Myositis



       D.O.    Unspec









 780.79  Malaise And Fatigue Other

 

 V82.2  Screening For Rheumatic Disorders Other

 

 719.56  Stiffness Joint Not Elsewhere Classified Lower Leg









 Office Visit  2014  9:05a  Main Office  Tito West M.D.  784.0  
Headache









 719.45  Pain Joint Pelvic Region & Thigh

 

 719.41  Pain Joint Shoulder Region









 Office Visit  2014  2:00p  Main Office  Douglas Copeland  V70.0  
Examination General



       M.D.    Medical Routine AT



           Health Care



           Facility









 245.2  Thyroiditis Chronic Lymphocytic

 

 780.97  Altered Mental Status

 

 780.79  Malaise And Fatigue Other









 Office Visit  2013  4:00p  Main Office  Douglas Copeland  719.47  Pain 
Joint Ankle



       M.D.    & Foot









 729.81  Swelling Of Limb









 Office Visit  2013  1:20p  Main Office  Karyn Cadena  724.9  Back 
Disorders



       P.A.    Other Unspec









 719.45  Pain Joint Pelvic Region & Thigh

 

 553.8  Hernia Other Spec Sites









 Office Visit  2012  4:00p  Main Office  Douglas Copeland  719.46  Pain 
Joint Lower



       M.D.    Leg









 530.81  Esophageal Reflux

 

 386.10  Vertigo Peripheral Unspec

 

 715.96  Osteoarthrosis Unspec Genlzd Or Localized Lower Leg









 Office Visit  2012  3:15p  Main Office  Douglas Copeland M.D.  723.1  
Cervicalgia









 536.8  Stomach Dyspepsia & Other Spec Disorders Of Function

 

 784.1  Throat Pain

 

 719.46  Pain Joint Lower Leg

 

 530.81  Esophageal Reflux

 

 386.10  Vertigo Peripheral Unspec

 

 V65.49  Counseling Other Spec









 Office Visit  08/10/2012  3:30p  Main Office  Douglas Copeland M.D.  727.1  
Bunion









 735.4  Hammer Toe Other Acquired

 

 401.1  Hypertension Benign

 

 346.10  Migraine Common W/O Intractable W/O Status Migrainosus

 

 V72.84  Examination Preoperative Unspec









 Office Visit  2012  3:45p  Main Office  Douglas Copeland  719.47  Pain 
Joint Ankle



       M.D.    & Foot









 727.1  Bunion

 

 695.3  Rosacea

 

 729.1  Myalgia & Myositis Unspec

 

 V65.49  Counseling Other Spec

 

 401.1  Hypertension Benign

 

 346.10  Migraine Common W/O Intractable W/O Status Migrainosus

 

 V06.1  Diphtheria-Tetanus-Pertusis Combined (DTaP)

 

 V07.2  Prophylactic Immunotherapy









 Office Visit  2012  2:30p  Main Office  Douglas Copeland,  719.47  Pain 
Joint Ankle



       M.D.    & Foot









 727.1  Bunion









 Office Visit  2012  3:45p  Main Office  Douglas Copeland,  729.1  Myalgia 
& Myositis



       M.D.    Unspec









 780.52  Insomnia Unspecified

 

 695.3  Rosacea

 

 V65.49  Counseling Other Spec









 Office Visit  2012  3:30p  Main Office  Douglas Copeland,  401.1  
Hypertension Benign



       M.D.    









 346.10  Migraine Common W/O Intractable W/O Status Migrainosus

 

 530.81  Esophageal Reflux

 

 V76.10  Screening For Malignant Neoplasm Breast

 

 729.1  Myalgia & Myositis Unspec

 

 780.52  Insomnia Unspecified

 

 V65.49  Counseling Other Spec









 Office Visit  2004  3:30p  Main Office  Douglas Copeland,  401.1  
Hypertension Benign



       M.D.    

 

 Office Visit  2004  1:15p  Main Office  Douglas Copeland,  401.1  
Hypertension Benign



       M.D.    









 V22.2  Pregnant State Incidental Normal









 Office Visit  2004  2:30p  Main Office  Douglas Copeland,  401.1  
Hypertension Benign



       M.D.    









 462  Pharyngitis Acute







Plan of Treatment

Future Appointment(s):2019 11:00 am - Yasmani Lopez D.O. at Main 
[id number] - EJ CesarR20.2 Paresthesia of skinFollow up:
continue using the braces at night  consider trying braces when ulhssqU59.5 
Neoplasm of uncertain behavior of skinM67.442 Ganglion, left handFollow up:
stable, less painM25.569 Pain in unspecified kneeFollow up:follow up if any sxs 
worsen, but seems like overall doing better

## 2019-07-26 ENCOUNTER — HOSPITAL ENCOUNTER (EMERGENCY)
Dept: HOSPITAL 25 - ED | Age: 54
Discharge: HOME | End: 2019-07-26
Payer: COMMERCIAL

## 2019-07-26 VITALS — DIASTOLIC BLOOD PRESSURE: 68 MMHG | SYSTOLIC BLOOD PRESSURE: 110 MMHG

## 2019-07-26 DIAGNOSIS — H60.92: Primary | ICD-10-CM

## 2019-07-26 DIAGNOSIS — K21.9: ICD-10-CM

## 2019-07-26 DIAGNOSIS — Z88.1: ICD-10-CM

## 2019-07-26 DIAGNOSIS — E06.3: ICD-10-CM

## 2019-07-26 DIAGNOSIS — Z87.891: ICD-10-CM

## 2019-07-26 DIAGNOSIS — I10: ICD-10-CM

## 2019-07-26 PROCEDURE — 99282 EMERGENCY DEPT VISIT SF MDM: CPT

## 2019-07-26 NOTE — ED
Throat Pain/Nasal Congestion





- HPI Summary


HPI Summary: 


This patient is a 54 year old F presenting to [facility] with a chief complaint of 

ear pain since 7/23/19. Pt reports stabbing pains in ears and she went to her 

PCP, who prescribed her Augmentin. Pt reports her ear canal felt swollen, green 

pus was coming out, and head felt numb. Pt denies diabetes. Pt has a Hx of 

Techno sclerosis, and repeated ear infection. Per triage, the patient rates the 

pain 4/10 in severity. 








- History of Current Complaint


Chief Complaint: EDEarPain


Time Seen by Provider: 07/26/19 03:25


Hx Obtained From: Patient


Onset/Duration: Gradual Onset, Lasting Days, Worse Since


Severity: Moderate


Associated Signs And Symptoms: Negative: Sinus Discomfort, Nasal Discharge


Cough: None





- Allergies/Home Medications


Allergies/Adverse Reactions: 


 Allergies











Allergy/AdvReac Type Severity Reaction Status Date / Time


 


erythromycin base AdvReac Mild GI Upset Verified 07/26/19 03:19





[From Erythrocin]     


 


envirmental Allergy Mild runny nose Uncoded 07/26/19 03:19





   and eyes  














PMH/Surg Hx/FS Hx/Imm Hx


Endocrine/Hematology History: Reports: Hx Thyroid Disease - hashimotos


   Denies: Hx Anticoagulant Therapy, Hx Diabetes


Cardiovascular History: Reports: Hx Hypertension


   Denies: Hx Congestive Heart Failure, Hx Deep Vein Thrombosis, Hx Myocardial 

Infarction, Hx Pacemaker/ICD


Respiratory History: 


   Denies: Hx Asthma, Hx Chronic Obstructive Pulmonary Disease (COPD), Hx Lung 

Cancer, Hx Pneumonia, Hx Pulmonary Embolism


GI History: Reports: Hx Ulcer - gerd


   Denies: Hx Gall Bladder Disease, Hx Gastrointestinal Bleed, Hx Urosepsis


 History: Reports: Hx Kidney Stones - None causing a problem at this time., 

Other  Problems/Disorders - hx stones per pt


   Denies: Hx Dialysis, Hx Renal Disease


Musculoskeletal History: Reports: Other Musculoskeletal History - Sciatica


Sensory History: 


   Denies: Hx Hearing Aid


Neurological History: Reports: Hx Migraine, Other Neuro Impairments/Disorders - 

vertigo hx occassional


   Denies: Hx Dementia, Hx Seizures, Hx Transient Ischemic Attacks (TIA)


Psychiatric History: 


   Denies: Hx Anxiety, Hx Depression, Hx Panic Disorder, Hx Schizophrenia, Hx 

Bipolar Disorder





- Cancer History


Hx Chemotherapy: No


Hx Radiation Therapy: No





- Surgical History


Surgery Procedure, Year, and Place: tubal ligation; right foot; left shoulder; 

septal plasty; ENDOMETRIAL ABLATION;.  left ear drum graft (no metal implanted- 

skin graft)


Infectious Disease History: No


Infectious Disease History: 


   Denies: Hx Clostridium Difficile, Hx Hepatitis, Hx Human Immunodeficiency 

Virus (HIV), Hx of Known/Suspected MRSA, Hx Shingles, Hx Tuberculosis, Traveled 

Outside the [place] in Last 30 Days





- Family History


Known Family History: Positive: Cardiac Disease - mother CHF, brother CHF, 

Hypertension


   Negative: Diabetes





- Social History


Occupation: Employed Part-time


Alcohol Use: None


Hx Substance Use: No


Substance Use Type: Reports: None


Smoking Status (MU): Former Smoker





Review of Systems


ENT: Other - pos - pus from ear


Positive: Ear Ache


Positive: Numbness - left side of face, near ear


All Other Systems Reviewed And Are Negative: Yes





Physical Exam





- Summary


Physical Exam Summary: 


Constitutional: Well-developed, Well-nourished, Alert. (-) Distressed


Skin: Warm, Dry


HENT:  Atraumatic; Ear canal is superlative, pus present, narrowing of ear canal

; No mastoid tenderness; Edema in auditory canal, purulence in auditory canal; 

TM sclerosed, tenderness to insertion of Eugene scope





Eyes: Conjunctiva normal


Neck: Musculoskeletal ROM normal neck. (-) JVD, (-) Stridor, (-) Tracheal 

deviation


Cardio: Rhythm regular, rate normal, Heart sounds normal; Intact distal pulses; 

The pedal pulses are 2+ and symmetric. Radial pulses are 2+ and symmetric.


Pulmonary/Chest wall: Effort normal. (-) Respiratory distress, (-) Wheezes, (-) 

Rales


Abd: Soft, (-) tenderness, (-) Distension, (-) Guarding, (-) Rebound


Musculoskeletal: (-) Edema


Neuro: Alert, Oriented x3


Psych: Mood and affect Normal





Triage Information Reviewed: Yes


Vital Signs On Initial Exam: 


 Initial Vitals











Temp Pulse Resp BP Pulse Ox


 


 97.9 F   64   20   151/76   99 


 


 07/26/19 03:15  07/26/19 03:15  07/26/19 03:15  07/26/19 03:15  07/26/19 03:15











Vital Signs Reviewed: Yes





Diagnostics





- Vital Signs


 Vital Signs











  Temp Pulse Resp BP Pulse Ox


 


 07/26/19 03:15  97.9 F  64  20  151/76  99














- Laboratory


Lab Statement: Any lab studies that have been ordered have been reviewed, and 

results considered in the medical decision making process.





EENT Course/Dx





- Course


Course Of Treatment: This patient is a 54 year old F presenting to [facility] with a 

chief complaint of ear pain since 7/23/19. Pt reports stabbing pains in ears 

and she went to her PCP, who prescribed her Augmentin. Pt reports her ear canal 

felt swollen, green pus was coming out, and head felt numb. Pt denies diabetes. 

Pt has a Hx of Techno sclerosis, and repeated ear infection. Per triage, the 

patient rates the pain 4/10 in severity.  Physical Exam Findings are ear canal 

is superlative, pus present, narrowing of ear canal, no mastoid tenderness, 

edema in auditory canal, purulence in auditory canal, TM sclerosed, tenderness 

to insertion of Eugene scope.  In the ED course the patient was given Ciproflox.  

Patient will be discharged. The patient is agreeable with this plan.





- Diagnoses


Provider Diagnoses: 


 Otitis externa








Discharge





- Sign-Out/Discharge


Documenting (check all that apply): Patient Departure - Discharge


Patient Received Moderate/Deep Sedation with Procedure: No





- Discharge Plan


Condition: Good


Disposition: HOME


Patient Education Materials:  Otitis Externa (ED)


Referrals: 


Douglas Copeland MD [Primary Care Provider] - 





- Billing Disposition and Condition


Condition: GOOD


Disposition: Home





- Attestation Statements


Document Initiated by Feng: Yes


Documenting Scribe: Alberta Marie


Provider For Whom Feng is Documenting (Include Credential): Dr. Jose Henry MD


Scribe Attestation: 


Alberta CUEVAS scribed for Dr. Jose Henry MD on 07/26/19 at 0609. 


Scribe Documentation Reviewed: Yes


Provider Attestation: 


The documentation as recorded by the Alberta lujan accurately 

reflects the service I personally performed and the decisions made by me, Dr. Jose Henry MD


Status of Scribe Document: Viewed